# Patient Record
Sex: MALE | Race: WHITE | Employment: FULL TIME | URBAN - NONMETROPOLITAN AREA
[De-identification: names, ages, dates, MRNs, and addresses within clinical notes are randomized per-mention and may not be internally consistent; named-entity substitution may affect disease eponyms.]

---

## 2019-05-13 ENCOUNTER — OFFICE VISIT (OUTPATIENT)
Dept: FAMILY MEDICINE CLINIC | Age: 49
End: 2019-05-13
Payer: COMMERCIAL

## 2019-05-13 VITALS
RESPIRATION RATE: 17 BRPM | HEART RATE: 81 BPM | DIASTOLIC BLOOD PRESSURE: 72 MMHG | OXYGEN SATURATION: 94 % | SYSTOLIC BLOOD PRESSURE: 120 MMHG | WEIGHT: 220 LBS | TEMPERATURE: 98.7 F | BODY MASS INDEX: 33.34 KG/M2 | HEIGHT: 68 IN

## 2019-05-13 DIAGNOSIS — R05.9 COUGH: ICD-10-CM

## 2019-05-13 DIAGNOSIS — J01.00 ACUTE NON-RECURRENT MAXILLARY SINUSITIS: Primary | ICD-10-CM

## 2019-05-13 PROCEDURE — 96372 THER/PROPH/DIAG INJ SC/IM: CPT | Performed by: FAMILY MEDICINE

## 2019-05-13 PROCEDURE — 99213 OFFICE O/P EST LOW 20 MIN: CPT | Performed by: FAMILY MEDICINE

## 2019-05-13 RX ORDER — MONTELUKAST SODIUM 10 MG/1
10 TABLET ORAL NIGHTLY
COMMUNITY

## 2019-05-13 RX ORDER — AMOXICILLIN 875 MG/1
875 TABLET, COATED ORAL 2 TIMES DAILY
Qty: 20 TABLET | Refills: 0 | Status: SHIPPED | OUTPATIENT
Start: 2019-05-13 | End: 2019-05-22

## 2019-05-13 RX ORDER — METHYLPREDNISOLONE ACETATE 40 MG/ML
40 INJECTION, SUSPENSION INTRA-ARTICULAR; INTRALESIONAL; INTRAMUSCULAR; SOFT TISSUE ONCE
Status: COMPLETED | OUTPATIENT
Start: 2019-05-13 | End: 2019-05-13

## 2019-05-13 RX ORDER — PREDNISONE 10 MG/1
TABLET ORAL
Qty: 18 TABLET | Refills: 0 | Status: SHIPPED | OUTPATIENT
Start: 2019-05-13 | End: 2019-05-22

## 2019-05-13 RX ADMIN — METHYLPREDNISOLONE ACETATE 40 MG: 40 INJECTION, SUSPENSION INTRA-ARTICULAR; INTRALESIONAL; INTRAMUSCULAR; SOFT TISSUE at 09:50

## 2019-05-13 ASSESSMENT — ENCOUNTER SYMPTOMS
CHEST TIGHTNESS: 0
GASTROINTESTINAL NEGATIVE: 1
TROUBLE SWALLOWING: 0
DIARRHEA: 0
ALLERGIC/IMMUNOLOGIC NEGATIVE: 1
BACK PAIN: 0
VOMITING: 0
PHOTOPHOBIA: 0
RHINORRHEA: 1
WHEEZING: 1
SINUS PRESSURE: 1
SHORTNESS OF BREATH: 0
COUGH: 0
EYE DISCHARGE: 0
EYE REDNESS: 0
BLOOD IN STOOL: 0
ABDOMINAL PAIN: 0
EYE PAIN: 0
SINUS PAIN: 1
NAUSEA: 0
SORE THROAT: 1

## 2019-05-13 NOTE — PROGRESS NOTES
19  Gali Ortiz : 1970 Sex: male  Age: 51 y.o. Chief Complaint   Patient presents with    Head Congestion       Congestion, pressure, drainage, facial tenderness, mild headache, onset 5 days ago. Denies fever, chills, diaphoresis, nausea, vomiting, decreased oral intake. Denies other GI or  complaints. OTC treatments minimally effective. Review of Systems   Constitutional: Negative. Negative for appetite change, fatigue and unexpected weight change. HENT: Positive for congestion, postnasal drip, rhinorrhea, sinus pressure, sinus pain, sneezing and sore throat. Negative for ear pain, hearing loss and trouble swallowing. Eyes: Negative for photophobia, pain, discharge and redness. Respiratory: Positive for wheezing. Negative for cough, chest tightness and shortness of breath. Cardiovascular: Negative. Negative for chest pain, palpitations and leg swelling. Gastrointestinal: Negative. Negative for abdominal pain, blood in stool, diarrhea, nausea and vomiting. Endocrine: Negative. Genitourinary: Negative for dysuria, flank pain, frequency and hematuria. Musculoskeletal: Negative for arthralgias, back pain, joint swelling and myalgias. Skin: Negative. Allergic/Immunologic: Negative. Neurological: Negative for dizziness, seizures, syncope, weakness, light-headedness, numbness and headaches. Hematological: Negative for adenopathy. Does not bruise/bleed easily. Psychiatric/Behavioral: Negative. Current Outpatient Medications:     montelukast (SINGULAIR) 10 MG tablet, Take 10 mg by mouth nightly, Disp: , Rfl:     amoxicillin (AMOXIL) 875 MG tablet, Take 1 tablet by mouth 2 times daily for 10 days, Disp: 20 tablet, Rfl: 0    predniSONE (DELTASONE) 10 MG tablet, Take 3 tablets by mouth daily for 3 days, THEN 2 tablets daily for 3 days, THEN 1 tablet daily for 3 days. , Disp: 18 tablet, Rfl: 0    atorvastatin (LIPITOR) 40 MG tablet, Take 40 mg by mouth daily., Disp: , Rfl:     fluticasone (FLONASE) 50 MCG/ACT nasal spray, 1 spray by Nasal route daily. , Disp: , Rfl:   Allergies   Allergen Reactions    Codeine     Sulfa Antibiotics        Past Medical History:   Diagnosis Date    Diverticulitis     Hyperlipidemia     Kidney stone      Past Surgical History:   Procedure Laterality Date    BACK SURGERY      HERNIA REPAIR       No family history on file.   Social History     Socioeconomic History    Marital status:      Spouse name: Not on file    Number of children: Not on file    Years of education: Not on file    Highest education level: Not on file   Occupational History    Not on file   Social Needs    Financial resource strain: Not on file    Food insecurity:     Worry: Not on file     Inability: Not on file    Transportation needs:     Medical: Not on file     Non-medical: Not on file   Tobacco Use    Smoking status: Former Smoker     Packs/day: 2.00     Years: 18.00     Pack years: 36.00     Types: Cigarettes     Last attempt to quit: 2018     Years since quittin.4    Smokeless tobacco: Never Used   Substance and Sexual Activity    Alcohol use: Yes     Comment: Social    Drug use: No    Sexual activity: Not on file   Lifestyle    Physical activity:     Days per week: Not on file     Minutes per session: Not on file    Stress: Not on file   Relationships    Social connections:     Talks on phone: Not on file     Gets together: Not on file     Attends Muslim service: Not on file     Active member of club or organization: Not on file     Attends meetings of clubs or organizations: Not on file     Relationship status: Not on file    Intimate partner violence:     Fear of current or ex partner: Not on file     Emotionally abused: Not on file     Physically abused: Not on file     Forced sexual activity: Not on file   Other Topics Concern    Not on file   Social History Narrative    Not on file       Vitals:    19 0915   BP: 120/72   Pulse: 81   Resp: 17   Temp: 98.7 °F (37.1 °C)   TempSrc: Temporal   SpO2: 94%   Weight: 220 lb (99.8 kg)   Height: 5' 8\" (1.727 m)       Physical Exam   Constitutional: He appears well-developed. HENT:   Head: Normocephalic. Right Ear: A middle ear effusion is present. Left Ear: A middle ear effusion is present. Nose: Rhinorrhea and sinus tenderness present. Right sinus exhibits maxillary sinus tenderness. Left sinus exhibits maxillary sinus tenderness. Mouth/Throat: Oropharyngeal exudate present. Cardiovascular: Normal rate, regular rhythm and normal heart sounds. Pulmonary/Chest: Effort normal and breath sounds normal.   Abdominal: Normal appearance and bowel sounds are normal.       Assessment and Plan:  Brodie Lundborg was seen today for head congestion. Diagnoses and all orders for this visit:    Acute non-recurrent maxillary sinusitis  -     amoxicillin (AMOXIL) 875 MG tablet; Take 1 tablet by mouth 2 times daily for 10 days  -     predniSONE (DELTASONE) 10 MG tablet; Take 3 tablets by mouth daily for 3 days, THEN 2 tablets daily for 3 days, THEN 1 tablet daily for 3 days. -     methylPREDNISolone acetate (DEPO-MEDROL) injection 40 mg    Cough  -     predniSONE (DELTASONE) 10 MG tablet; Take 3 tablets by mouth daily for 3 days, THEN 2 tablets daily for 3 days, THEN 1 tablet daily for 3 days. -     methylPREDNISolone acetate (DEPO-MEDROL) injection 40 mg    Tylenol, fluids ,rest, Mucinex    Return if symptoms worsen or fail to improve.       Seen By:  Raphael Wilburn DO

## 2019-05-13 NOTE — LETTER
[facility]  [address] [id number]  Phone: [phone]  Fax: [phone] [facility],         May 13, 2019     Patient: Douglas Levin   YOB: 1970   Date of Visit: 5/13/2019       To Whom It May Concern: It is my medical opinion that Douglas Levin  Seen in my office today. May return to work on 5/14/19. If you have any questions or concerns, please don't hesitate to call.     Sincerely,        Aldo Mackay, DO

## 2019-05-22 ENCOUNTER — OFFICE VISIT (OUTPATIENT)
Dept: FAMILY MEDICINE CLINIC | Age: 49
End: 2019-05-22
Payer: COMMERCIAL

## 2019-05-22 VITALS
TEMPERATURE: 97.7 F | SYSTOLIC BLOOD PRESSURE: 122 MMHG | OXYGEN SATURATION: 98 % | WEIGHT: 215 LBS | DIASTOLIC BLOOD PRESSURE: 74 MMHG | HEART RATE: 90 BPM | BODY MASS INDEX: 32.69 KG/M2

## 2019-05-22 DIAGNOSIS — J01.40 ACUTE NON-RECURRENT PANSINUSITIS: Primary | ICD-10-CM

## 2019-05-22 DIAGNOSIS — H81.313 VERTIGO, AURAL, BILATERAL: ICD-10-CM

## 2019-05-22 PROCEDURE — 99213 OFFICE O/P EST LOW 20 MIN: CPT | Performed by: FAMILY MEDICINE

## 2019-05-22 RX ORDER — CEFDINIR 300 MG/1
300 CAPSULE ORAL 2 TIMES DAILY
Qty: 14 CAPSULE | Refills: 0 | Status: SHIPPED | OUTPATIENT
Start: 2019-05-22 | End: 2019-05-29

## 2019-05-22 RX ORDER — ROSUVASTATIN CALCIUM 10 MG/1
TABLET, COATED ORAL
COMMUNITY
Start: 2019-03-08 | End: 2019-05-22

## 2019-05-22 ASSESSMENT — ENCOUNTER SYMPTOMS
EYE PAIN: 0
SINUS PAIN: 1
SHORTNESS OF BREATH: 0
EYE DISCHARGE: 0
NAUSEA: 0
BACK PAIN: 0
DIARRHEA: 0
SINUS PRESSURE: 1
CHEST TIGHTNESS: 0
BLOOD IN STOOL: 0
SORE THROAT: 1
ABDOMINAL PAIN: 0
ALLERGIC/IMMUNOLOGIC NEGATIVE: 1
COUGH: 0
TROUBLE SWALLOWING: 0
PHOTOPHOBIA: 0
EYE REDNESS: 0
VOMITING: 0

## 2019-05-22 NOTE — PROGRESS NOTES
19  Danny Ramos : 1970 Sex: male  Age: 51 y.o. Chief Complaint   Patient presents with    Congestion    Drainage    Pharyngitis    Dizziness       Congestion, pressure, drainage, facial tenderness, mild headache, onset 10 days ago. Denies fever, chills, diaphoresis, nausea, vomiting, decreased oral intake. Denies other GI or  complaints. Seen EXP 1 week ago. Improved on Amox, Prednisome but not back to baseline yet. Mild vertigo now with cough      Review of Systems   Constitutional: Negative for appetite change, fatigue and unexpected weight change. HENT: Positive for congestion, postnasal drip, sinus pressure, sinus pain and sore throat. Negative for ear pain, hearing loss and trouble swallowing. Eyes: Negative for photophobia, pain, discharge and redness. Respiratory: Negative for cough, chest tightness and shortness of breath. Cardiovascular: Negative for chest pain, palpitations and leg swelling. Gastrointestinal: Negative for abdominal pain, blood in stool, diarrhea, nausea and vomiting. Endocrine: Negative. Genitourinary: Negative for dysuria, flank pain, frequency and hematuria. Musculoskeletal: Negative for arthralgias, back pain, joint swelling and myalgias. Skin: Negative. Allergic/Immunologic: Negative. Neurological: Positive for dizziness. Negative for seizures, syncope, weakness, light-headedness, numbness and headaches. Hematological: Negative for adenopathy. Does not bruise/bleed easily. Psychiatric/Behavioral: Negative. Current Outpatient Medications:     Meloxicam 15 MG TBDP, Take by mouth, Disp: , Rfl:     cefdinir (OMNICEF) 300 MG capsule, Take 1 capsule by mouth 2 times daily for 7 days, Disp: 14 capsule, Rfl: 0    montelukast (SINGULAIR) 10 MG tablet, Take 10 mg by mouth nightly, Disp: , Rfl:     atorvastatin (LIPITOR) 40 MG tablet, Take 40 mg by mouth daily. , Disp: , Rfl:     fluticasone (FLONASE) 50 MCG/ACT nasal spray, 1 spray by Nasal route daily. , Disp: , Rfl:   Allergies   Allergen Reactions    Codeine     Sulfa Antibiotics        Past Medical History:   Diagnosis Date    Diverticulitis     Hyperlipidemia     Kidney stone      Past Surgical History:   Procedure Laterality Date    BACK SURGERY      HERNIA REPAIR       No family history on file.   Social History     Socioeconomic History    Marital status:      Spouse name: Not on file    Number of children: Not on file    Years of education: Not on file    Highest education level: Not on file   Occupational History    Not on file   Social Needs    Financial resource strain: Not on file    Food insecurity:     Worry: Not on file     Inability: Not on file    Transportation needs:     Medical: Not on file     Non-medical: Not on file   Tobacco Use    Smoking status: Former Smoker     Packs/day: 2.00     Years: 18.00     Pack years: 36.00     Types: Cigarettes     Last attempt to quit: 2018     Years since quittin.4    Smokeless tobacco: Never Used   Substance and Sexual Activity    Alcohol use: Yes     Comment: Social    Drug use: No    Sexual activity: Not on file   Lifestyle    Physical activity:     Days per week: Not on file     Minutes per session: Not on file    Stress: Not on file   Relationships    Social connections:     Talks on phone: Not on file     Gets together: Not on file     Attends Orthodoxy service: Not on file     Active member of club or organization: Not on file     Attends meetings of clubs or organizations: Not on file     Relationship status: Not on file    Intimate partner violence:     Fear of current or ex partner: Not on file     Emotionally abused: Not on file     Physically abused: Not on file     Forced sexual activity: Not on file   Other Topics Concern    Not on file   Social History Narrative    Not on file       Vitals:    19 1323   BP: 122/74   Pulse: 90   Temp: 97.7 °F (36.5 °C)   SpO2: 98% Weight: 215 lb (97.5 kg)       Physical Exam   Constitutional: He is oriented to person, place, and time. He appears well-developed and well-nourished. HENT:   Head: Atraumatic. Right Ear: External ear normal. A middle ear effusion is present. Left Ear: External ear normal. A middle ear effusion is present. Nose: Mucosal edema and sinus tenderness present. Mouth/Throat: Posterior oropharyngeal erythema present. No oropharyngeal exudate. Eyes: Pupils are equal, round, and reactive to light. Conjunctivae and EOM are normal.   Neck: Normal range of motion. Neck supple. No tracheal deviation present. No thyromegaly present. Cardiovascular: Normal rate, regular rhythm and intact distal pulses. Exam reveals no gallop and no friction rub. No murmur heard. Pulmonary/Chest: Effort normal and breath sounds normal. No respiratory distress. Abdominal: Soft. Bowel sounds are normal.   Musculoskeletal: Normal range of motion. He exhibits no edema, tenderness or deformity. Lymphadenopathy:     He has no cervical adenopathy. Neurological: He is alert and oriented to person, place, and time. He displays normal reflexes. No sensory deficit. He exhibits normal muscle tone. Coordination normal.   Skin: Skin is warm and dry. Capillary refill takes less than 2 seconds. No rash noted. Psychiatric: He has a normal mood and affect. Assessment and Plan:  Ricardo Thomas was seen today for congestion, drainage, pharyngitis and dizziness. Diagnoses and all orders for this visit:    Acute non-recurrent pansinusitis  -     cefdinir (OMNICEF) 300 MG capsule; Take 1 capsule by mouth 2 times daily for 7 days    Vertigo, aural, bilateral    Claritin, Nasacort, fluids, Mucinex    Return in about 2 days (around 5/24/2019) for with pcp.       Seen By:  Anisha Lorenz DO

## 2019-09-13 ENCOUNTER — OFFICE VISIT (OUTPATIENT)
Dept: FAMILY MEDICINE CLINIC | Age: 49
End: 2019-09-13
Payer: COMMERCIAL

## 2019-09-13 VITALS
BODY MASS INDEX: 31.7 KG/M2 | WEIGHT: 209.13 LBS | HEART RATE: 72 BPM | DIASTOLIC BLOOD PRESSURE: 62 MMHG | HEIGHT: 68 IN | OXYGEN SATURATION: 97 % | TEMPERATURE: 98.8 F | SYSTOLIC BLOOD PRESSURE: 116 MMHG

## 2019-09-13 DIAGNOSIS — J01.91 ACUTE RECURRENT SINUSITIS, UNSPECIFIED LOCATION: Primary | ICD-10-CM

## 2019-09-13 PROCEDURE — 99213 OFFICE O/P EST LOW 20 MIN: CPT | Performed by: NURSE PRACTITIONER

## 2019-09-13 RX ORDER — PREDNISONE 10 MG/1
TABLET ORAL
Qty: 13 TABLET | Refills: 0 | Status: SHIPPED | OUTPATIENT
Start: 2019-09-13 | End: 2019-11-27

## 2019-09-13 RX ORDER — LANCETS 33 GAUGE
EACH MISCELLANEOUS
COMMUNITY
Start: 2019-07-01

## 2019-09-13 RX ORDER — CEFDINIR 300 MG/1
300 CAPSULE ORAL 2 TIMES DAILY
Qty: 14 CAPSULE | Refills: 0 | Status: SHIPPED | OUTPATIENT
Start: 2019-09-13 | End: 2019-09-20

## 2019-09-13 RX ORDER — ASPIRIN 81 MG/1
81 TABLET ORAL DAILY
COMMUNITY
Start: 2019-09-05

## 2019-09-13 RX ORDER — TICAGRELOR 90 MG/1
90 TABLET ORAL 2 TIMES DAILY
COMMUNITY
Start: 2019-09-05

## 2019-09-13 RX ORDER — CARVEDILOL 3.12 MG/1
3.12 TABLET ORAL 2 TIMES DAILY WITH MEALS
COMMUNITY
Start: 2019-08-31

## 2019-09-13 RX ORDER — LISINOPRIL 5 MG/1
5 TABLET ORAL DAILY
COMMUNITY
Start: 2019-08-31

## 2019-09-13 RX ORDER — ATORVASTATIN CALCIUM 80 MG/1
TABLET, FILM COATED ORAL
COMMUNITY
Start: 2019-08-31

## 2019-09-13 RX ORDER — PANTOPRAZOLE SODIUM 40 MG/1
40 TABLET, DELAYED RELEASE ORAL DAILY
COMMUNITY
Start: 2019-08-31

## 2019-09-13 NOTE — PROGRESS NOTES
Number of children: Not on file    Years of education: Not on file    Highest education level: Not on file   Occupational History    Not on file   Social Needs    Financial resource strain: Not on file    Food insecurity:     Worry: Not on file     Inability: Not on file    Transportation needs:     Medical: Not on file     Non-medical: Not on file   Tobacco Use    Smoking status: Former Smoker     Packs/day: 2.00     Years: 18.00     Pack years: 36.00     Types: Cigarettes     Last attempt to quit: 2018     Years since quittin.7    Smokeless tobacco: Never Used   Substance and Sexual Activity    Alcohol use: Yes     Comment: Social    Drug use: No    Sexual activity: Not on file   Lifestyle    Physical activity:     Days per week: Not on file     Minutes per session: Not on file    Stress: Not on file   Relationships    Social connections:     Talks on phone: Not on file     Gets together: Not on file     Attends Jewish service: Not on file     Active member of club or organization: Not on file     Attends meetings of clubs or organizations: Not on file     Relationship status: Not on file    Intimate partner violence:     Fear of current or ex partner: Not on file     Emotionally abused: Not on file     Physically abused: Not on file     Forced sexual activity: Not on file   Other Topics Concern    Not on file   Social History Narrative    Not on file       Objective:  Vitals:    19 0911   BP: 116/62   Pulse: 72   Temp: 98.8 °F (37.1 °C)   SpO2: 97%   Weight: 209 lb 2 oz (94.9 kg)   Height: 5' 8\" (1.727 m)       Exam:  Physical Exam   Constitutional: He is oriented to person, place, and time. He appears well-developed and well-nourished. Mildly ill   HENT:   Head: Normocephalic. Right Ear: Hearing and external ear normal.   Left Ear: Hearing and external ear normal.   Nose: Mucosal edema and rhinorrhea present.    Mouth/Throat: Mucous membranes are normal. Oropharyngeal

## 2019-11-27 ENCOUNTER — OFFICE VISIT (OUTPATIENT)
Dept: PODIATRY | Age: 49
End: 2019-11-27
Payer: COMMERCIAL

## 2019-11-27 VITALS
BODY MASS INDEX: 31.93 KG/M2 | TEMPERATURE: 97.6 F | WEIGHT: 210 LBS | DIASTOLIC BLOOD PRESSURE: 80 MMHG | SYSTOLIC BLOOD PRESSURE: 110 MMHG

## 2019-11-27 VITALS
WEIGHT: 210 LBS | BODY MASS INDEX: 31.93 KG/M2 | TEMPERATURE: 97.6 F | SYSTOLIC BLOOD PRESSURE: 110 MMHG | DIASTOLIC BLOOD PRESSURE: 80 MMHG

## 2019-11-27 DIAGNOSIS — M76.62 ACHILLES BURSITIS OF LEFT LOWER EXTREMITY: Primary | ICD-10-CM

## 2019-11-27 DIAGNOSIS — M92.62 HAGLUND'S DEFORMITY, LEFT: ICD-10-CM

## 2019-11-27 DIAGNOSIS — M76.62 ACHILLES BURSITIS OF LEFT LOWER EXTREMITY: ICD-10-CM

## 2019-11-27 DIAGNOSIS — G89.29 CHRONIC PAIN OF LEFT ANKLE: ICD-10-CM

## 2019-11-27 DIAGNOSIS — M25.572 CHRONIC PAIN OF LEFT ANKLE: Primary | ICD-10-CM

## 2019-11-27 DIAGNOSIS — M25.572 CHRONIC PAIN OF LEFT ANKLE: ICD-10-CM

## 2019-11-27 DIAGNOSIS — M77.32 CALCANEAL SPUR OF LEFT FOOT: ICD-10-CM

## 2019-11-27 DIAGNOSIS — G89.29 CHRONIC PAIN OF LEFT ANKLE: Primary | ICD-10-CM

## 2019-11-27 PROCEDURE — 99213 OFFICE O/P EST LOW 20 MIN: CPT | Performed by: PODIATRIST

## 2019-12-05 ENCOUNTER — OFFICE VISIT (OUTPATIENT)
Dept: FAMILY MEDICINE CLINIC | Age: 49
End: 2019-12-05
Payer: COMMERCIAL

## 2019-12-05 VITALS
DIASTOLIC BLOOD PRESSURE: 60 MMHG | HEIGHT: 68 IN | WEIGHT: 210.5 LBS | HEART RATE: 58 BPM | BODY MASS INDEX: 31.9 KG/M2 | SYSTOLIC BLOOD PRESSURE: 100 MMHG | OXYGEN SATURATION: 96 % | TEMPERATURE: 98.6 F

## 2019-12-05 DIAGNOSIS — H10.31 ACUTE BACTERIAL CONJUNCTIVITIS OF RIGHT EYE: ICD-10-CM

## 2019-12-05 DIAGNOSIS — J06.9 ACUTE URI: Primary | ICD-10-CM

## 2019-12-05 PROCEDURE — 99213 OFFICE O/P EST LOW 20 MIN: CPT | Performed by: PHYSICIAN ASSISTANT

## 2019-12-05 RX ORDER — METHYLPREDNISOLONE 4 MG/1
TABLET ORAL
Qty: 1 KIT | Refills: 0 | Status: SHIPPED
Start: 2019-12-05 | End: 2020-03-20

## 2019-12-05 RX ORDER — MOXIFLOXACIN 5 MG/ML
1 SOLUTION/ DROPS OPHTHALMIC 3 TIMES DAILY
Qty: 1 BOTTLE | Refills: 0 | Status: SHIPPED
Start: 2019-12-05 | End: 2020-03-20

## 2019-12-05 RX ORDER — AZITHROMYCIN 250 MG/1
250 TABLET, FILM COATED ORAL SEE ADMIN INSTRUCTIONS
Qty: 6 TABLET | Refills: 0 | Status: SHIPPED | OUTPATIENT
Start: 2019-12-05 | End: 2019-12-10

## 2019-12-05 ASSESSMENT — ENCOUNTER SYMPTOMS
WHEEZING: 0
SINUS PAIN: 1
SORE THROAT: 0
SINUS PRESSURE: 1
EYE DISCHARGE: 1
SHORTNESS OF BREATH: 0
COUGH: 0
EYE PAIN: 0
TROUBLE SWALLOWING: 0
EYE ITCHING: 0

## 2019-12-17 ENCOUNTER — HOSPITAL ENCOUNTER (OUTPATIENT)
Age: 49
Discharge: HOME | End: 2019-12-19
Payer: COMMERCIAL

## 2019-12-17 LAB — HOMOCYSTEINE: 9.1 UMOL/L (ref 0–15)

## 2019-12-17 PROCEDURE — 85300 ANTITHROMBIN III ACTIVITY: CPT

## 2019-12-17 PROCEDURE — 81241 F5 GENE: CPT

## 2019-12-17 PROCEDURE — 36415 COLL VENOUS BLD VENIPUNCTURE: CPT

## 2019-12-17 PROCEDURE — 85305 CLOT INHIBIT PROT S TOTAL: CPT

## 2019-12-17 PROCEDURE — 83090 ASSAY OF HOMOCYSTEINE: CPT

## 2019-12-17 PROCEDURE — 85303 CLOT INHIBIT PROT C ACTIVITY: CPT

## 2019-12-19 LAB — AT-III ACTIVITY: 79 % ACTIVITY (ref 83–121)

## 2019-12-20 LAB
FACTOR V LEIDEN: NEGATIVE
PROTEIN C ACTIVITY: 110 % ACTIVITY (ref 68–165)
SPECIMEN: NORMAL

## 2019-12-22 LAB — PROTEIN S, FUNCTIONAL: 97 % (ref 66–143)

## 2019-12-26 ENCOUNTER — HOSPITAL ENCOUNTER (OUTPATIENT)
Age: 49
Setting detail: OBSERVATION
Discharge: HOME | End: 2019-12-27
Attending: EMERGENCY MEDICINE | Admitting: INTERNAL MEDICINE
Payer: COMMERCIAL

## 2019-12-26 ENCOUNTER — APPOINTMENT (OUTPATIENT)
Dept: GENERAL RADIOLOGY | Age: 49
End: 2019-12-26
Payer: COMMERCIAL

## 2019-12-26 DIAGNOSIS — R07.9 CHEST PAIN, UNSPECIFIED TYPE: Primary | ICD-10-CM

## 2019-12-26 PROBLEM — E78.5 HLD (HYPERLIPIDEMIA): Status: ACTIVE | Noted: 2019-12-26

## 2019-12-26 PROBLEM — I10 ESSENTIAL HYPERTENSION: Status: ACTIVE | Noted: 2019-12-26

## 2019-12-26 LAB
ANION GAP SERPL CALCULATED.3IONS-SCNC: 12 MMOL/L (ref 7–16)
BASOPHILS ABSOLUTE: 0.07 E9/L (ref 0–0.2)
BASOPHILS RELATIVE PERCENT: 0.7 % (ref 0–2)
BUN BLDV-MCNC: 17 MG/DL (ref 6–20)
CALCIUM SERPL-MCNC: 8.7 MG/DL (ref 8.6–10.2)
CHLORIDE BLD-SCNC: 103 MMOL/L (ref 98–107)
CO2: 22 MMOL/L (ref 22–29)
CREAT SERPL-MCNC: 0.9 MG/DL (ref 0.7–1.2)
EOSINOPHILS ABSOLUTE: 0.25 E9/L (ref 0.05–0.5)
EOSINOPHILS RELATIVE PERCENT: 2.5 % (ref 0–6)
GFR AFRICAN AMERICAN: >60
GFR NON-AFRICAN AMERICAN: >60 ML/MIN/1.73
GLUCOSE BLD-MCNC: 159 MG/DL (ref 74–99)
HCT VFR BLD CALC: 41.5 % (ref 37–54)
HEMOGLOBIN: 13.8 G/DL (ref 12.5–16.5)
IMMATURE GRANULOCYTES #: 0.07 E9/L
IMMATURE GRANULOCYTES %: 0.7 % (ref 0–5)
LYMPHOCYTES ABSOLUTE: 3.22 E9/L (ref 1.5–4)
LYMPHOCYTES RELATIVE PERCENT: 31.7 % (ref 20–42)
MCH RBC QN AUTO: 31.6 PG (ref 26–35)
MCHC RBC AUTO-ENTMCNC: 33.3 % (ref 32–34.5)
MCV RBC AUTO: 95 FL (ref 80–99.9)
MONOCYTES ABSOLUTE: 0.82 E9/L (ref 0.1–0.95)
MONOCYTES RELATIVE PERCENT: 8.1 % (ref 2–12)
NEUTROPHILS ABSOLUTE: 5.74 E9/L (ref 1.8–7.3)
NEUTROPHILS RELATIVE PERCENT: 56.3 % (ref 43–80)
PDW BLD-RTO: 13 FL (ref 11.5–15)
PLATELET # BLD: 177 E9/L (ref 130–450)
PMV BLD AUTO: 9.7 FL (ref 7–12)
POTASSIUM SERPL-SCNC: 3.6 MMOL/L (ref 3.5–5)
PRO-BNP: 63 PG/ML (ref 0–125)
RBC # BLD: 4.37 E12/L (ref 3.8–5.8)
SODIUM BLD-SCNC: 137 MMOL/L (ref 132–146)
TROPONIN: <0.01 NG/ML (ref 0–0.03)
WBC # BLD: 10.2 E9/L (ref 4.5–11.5)

## 2019-12-26 PROCEDURE — 6370000000 HC RX 637 (ALT 250 FOR IP): Performed by: STUDENT IN AN ORGANIZED HEALTH CARE EDUCATION/TRAINING PROGRAM

## 2019-12-26 PROCEDURE — 99285 EMERGENCY DEPT VISIT HI MDM: CPT

## 2019-12-26 PROCEDURE — 94760 N-INVAS EAR/PLS OXIMETRY 1: CPT

## 2019-12-26 PROCEDURE — 93005 ELECTROCARDIOGRAM TRACING: CPT | Performed by: STUDENT IN AN ORGANIZED HEALTH CARE EDUCATION/TRAINING PROGRAM

## 2019-12-26 PROCEDURE — 84484 ASSAY OF TROPONIN QUANT: CPT

## 2019-12-26 PROCEDURE — 85025 COMPLETE CBC W/AUTO DIFF WBC: CPT

## 2019-12-26 PROCEDURE — 71045 X-RAY EXAM CHEST 1 VIEW: CPT

## 2019-12-26 PROCEDURE — 83880 ASSAY OF NATRIURETIC PEPTIDE: CPT

## 2019-12-26 PROCEDURE — 80048 BASIC METABOLIC PNL TOTAL CA: CPT

## 2019-12-26 RX ORDER — ASPIRIN 81 MG/1
243 TABLET, CHEWABLE ORAL ONCE
Status: COMPLETED | OUTPATIENT
Start: 2019-12-26 | End: 2019-12-26

## 2019-12-26 RX ADMIN — ASPIRIN 81 MG 243 MG: 81 TABLET ORAL at 23:04

## 2019-12-26 ASSESSMENT — PAIN SCALES - GENERAL
PAINLEVEL_OUTOF10: 1
PAINLEVEL_OUTOF10: 8

## 2019-12-26 ASSESSMENT — PAIN DESCRIPTION - PROGRESSION: CLINICAL_PROGRESSION: NOT CHANGED

## 2019-12-26 ASSESSMENT — PAIN DESCRIPTION - PAIN TYPE
TYPE: ACUTE PAIN
TYPE: ACUTE PAIN

## 2019-12-26 ASSESSMENT — HEART SCORE: ECG: 1

## 2019-12-26 ASSESSMENT — PAIN DESCRIPTION - FREQUENCY
FREQUENCY: INTERMITTENT
FREQUENCY: INTERMITTENT

## 2019-12-26 ASSESSMENT — PAIN DESCRIPTION - ORIENTATION: ORIENTATION: MID

## 2019-12-26 ASSESSMENT — PAIN DESCRIPTION - DESCRIPTORS: DESCRIPTORS: ACHING

## 2019-12-26 ASSESSMENT — PAIN DESCRIPTION - LOCATION
LOCATION: CHEST
LOCATION: CHEST

## 2019-12-27 ENCOUNTER — APPOINTMENT (OUTPATIENT)
Dept: NUCLEAR MEDICINE | Age: 49
End: 2019-12-27
Payer: COMMERCIAL

## 2019-12-27 VITALS
WEIGHT: 205.4 LBS | HEIGHT: 68 IN | TEMPERATURE: 97.9 F | OXYGEN SATURATION: 97 % | DIASTOLIC BLOOD PRESSURE: 68 MMHG | HEART RATE: 74 BPM | SYSTOLIC BLOOD PRESSURE: 117 MMHG | RESPIRATION RATE: 16 BRPM | BODY MASS INDEX: 31.13 KG/M2

## 2019-12-27 PROBLEM — E78.5 HLD (HYPERLIPIDEMIA): Status: RESOLVED | Noted: 2019-12-26 | Resolved: 2019-12-27

## 2019-12-27 PROBLEM — E78.5 HYPERLIPIDEMIA LDL GOAL <100: Chronic | Status: ACTIVE | Noted: 2019-12-27

## 2019-12-27 PROBLEM — R07.9 CHEST PAIN: Status: ACTIVE | Noted: 2019-12-27

## 2019-12-27 PROBLEM — R07.9 CHEST PAIN: Status: RESOLVED | Noted: 2019-12-26 | Resolved: 2019-12-27

## 2019-12-27 PROBLEM — I25.10 CAD (CORONARY ARTERY DISEASE), NATIVE CORONARY ARTERY: Chronic | Status: ACTIVE | Noted: 2019-12-27

## 2019-12-27 PROBLEM — E66.9 OBESITY (BMI 30-39.9): Chronic | Status: ACTIVE | Noted: 2019-12-27

## 2019-12-27 LAB
ANION GAP SERPL CALCULATED.3IONS-SCNC: 13 MMOL/L (ref 7–16)
BUN BLDV-MCNC: 16 MG/DL (ref 6–20)
CALCIUM SERPL-MCNC: 8.8 MG/DL (ref 8.6–10.2)
CHLORIDE BLD-SCNC: 104 MMOL/L (ref 98–107)
CO2: 22 MMOL/L (ref 22–29)
CREAT SERPL-MCNC: 1 MG/DL (ref 0.7–1.2)
EKG ATRIAL RATE: 65 BPM
EKG P AXIS: 12 DEGREES
EKG P-R INTERVAL: 126 MS
EKG Q-T INTERVAL: 400 MS
EKG QRS DURATION: 92 MS
EKG QTC CALCULATION (BAZETT): 416 MS
EKG R AXIS: -12 DEGREES
EKG T AXIS: 51 DEGREES
EKG VENTRICULAR RATE: 65 BPM
GFR AFRICAN AMERICAN: >60
GFR NON-AFRICAN AMERICAN: >60 ML/MIN/1.73
GLUCOSE BLD-MCNC: 135 MG/DL (ref 74–99)
HCT VFR BLD CALC: 42.7 % (ref 37–54)
HEMOGLOBIN: 13.8 G/DL (ref 12.5–16.5)
LV EF: 40 %
LVEF MODALITY: NORMAL
MCH RBC QN AUTO: 31.3 PG (ref 26–35)
MCHC RBC AUTO-ENTMCNC: 32.3 % (ref 32–34.5)
MCV RBC AUTO: 96.8 FL (ref 80–99.9)
PDW BLD-RTO: 13.1 FL (ref 11.5–15)
PLATELET # BLD: 187 E9/L (ref 130–450)
PMV BLD AUTO: 9.9 FL (ref 7–12)
POTASSIUM REFLEX MAGNESIUM: 3.9 MMOL/L (ref 3.5–5)
RBC # BLD: 4.41 E12/L (ref 3.8–5.8)
SODIUM BLD-SCNC: 139 MMOL/L (ref 132–146)
TROPONIN: <0.01 NG/ML (ref 0–0.03)
WBC # BLD: 8.6 E9/L (ref 4.5–11.5)

## 2019-12-27 PROCEDURE — 6360000002 HC RX W HCPCS: Performed by: NURSE PRACTITIONER

## 2019-12-27 PROCEDURE — 2580000003 HC RX 258: Performed by: NURSE PRACTITIONER

## 2019-12-27 PROCEDURE — 78452 HT MUSCLE IMAGE SPECT MULT: CPT

## 2019-12-27 PROCEDURE — APPSS60 APP SPLIT SHARED TIME 46-60 MINUTES: Performed by: PHYSICIAN ASSISTANT

## 2019-12-27 PROCEDURE — 3430000000 HC RX DIAGNOSTIC RADIOPHARMACEUTICAL: Performed by: RADIOLOGY

## 2019-12-27 PROCEDURE — G0378 HOSPITAL OBSERVATION PER HR: HCPCS

## 2019-12-27 PROCEDURE — 93017 CV STRESS TEST TRACING ONLY: CPT

## 2019-12-27 PROCEDURE — 84484 ASSAY OF TROPONIN QUANT: CPT

## 2019-12-27 PROCEDURE — 93016 CV STRESS TEST SUPVJ ONLY: CPT | Performed by: INTERNAL MEDICINE

## 2019-12-27 PROCEDURE — A9500 TC99M SESTAMIBI: HCPCS | Performed by: RADIOLOGY

## 2019-12-27 PROCEDURE — 6370000000 HC RX 637 (ALT 250 FOR IP): Performed by: NURSE PRACTITIONER

## 2019-12-27 PROCEDURE — 85027 COMPLETE CBC AUTOMATED: CPT

## 2019-12-27 PROCEDURE — 36415 COLL VENOUS BLD VENIPUNCTURE: CPT

## 2019-12-27 PROCEDURE — 80048 BASIC METABOLIC PNL TOTAL CA: CPT

## 2019-12-27 PROCEDURE — 96372 THER/PROPH/DIAG INJ SC/IM: CPT

## 2019-12-27 PROCEDURE — 99244 OFF/OP CNSLTJ NEW/EST MOD 40: CPT | Performed by: INTERNAL MEDICINE

## 2019-12-27 PROCEDURE — 93018 CV STRESS TEST I&R ONLY: CPT | Performed by: INTERNAL MEDICINE

## 2019-12-27 RX ORDER — PANTOPRAZOLE SODIUM 40 MG/1
40 TABLET, DELAYED RELEASE ORAL
Status: DISCONTINUED | OUTPATIENT
Start: 2019-12-27 | End: 2019-12-27 | Stop reason: HOSPADM

## 2019-12-27 RX ORDER — POTASSIUM CHLORIDE 7.45 MG/ML
10 INJECTION INTRAVENOUS PRN
Status: DISCONTINUED | OUTPATIENT
Start: 2019-12-27 | End: 2019-12-27 | Stop reason: HOSPADM

## 2019-12-27 RX ORDER — MAGNESIUM SULFATE 1 G/100ML
1 INJECTION INTRAVENOUS PRN
Status: DISCONTINUED | OUTPATIENT
Start: 2019-12-27 | End: 2019-12-27 | Stop reason: HOSPADM

## 2019-12-27 RX ORDER — SODIUM CHLORIDE 0.9 % (FLUSH) 0.9 %
10 SYRINGE (ML) INJECTION EVERY 12 HOURS SCHEDULED
Status: DISCONTINUED | OUTPATIENT
Start: 2019-12-27 | End: 2019-12-27 | Stop reason: HOSPADM

## 2019-12-27 RX ORDER — SODIUM CHLORIDE 0.9 % (FLUSH) 0.9 %
10 SYRINGE (ML) INJECTION PRN
Status: DISCONTINUED | OUTPATIENT
Start: 2019-12-27 | End: 2019-12-27 | Stop reason: HOSPADM

## 2019-12-27 RX ORDER — ACETAMINOPHEN 325 MG/1
650 TABLET ORAL EVERY 4 HOURS PRN
Status: DISCONTINUED | OUTPATIENT
Start: 2019-12-27 | End: 2019-12-27 | Stop reason: HOSPADM

## 2019-12-27 RX ORDER — ATORVASTATIN CALCIUM 40 MG/1
40 TABLET, FILM COATED ORAL NIGHTLY
Status: DISCONTINUED | OUTPATIENT
Start: 2019-12-27 | End: 2019-12-27 | Stop reason: HOSPADM

## 2019-12-27 RX ORDER — CARVEDILOL 3.12 MG/1
3.12 TABLET ORAL 2 TIMES DAILY WITH MEALS
Status: DISCONTINUED | OUTPATIENT
Start: 2019-12-27 | End: 2019-12-27 | Stop reason: HOSPADM

## 2019-12-27 RX ORDER — POTASSIUM CHLORIDE 20 MEQ/1
40 TABLET, EXTENDED RELEASE ORAL PRN
Status: DISCONTINUED | OUTPATIENT
Start: 2019-12-27 | End: 2019-12-27 | Stop reason: HOSPADM

## 2019-12-27 RX ORDER — LISINOPRIL 5 MG/1
5 TABLET ORAL DAILY
Status: DISCONTINUED | OUTPATIENT
Start: 2019-12-27 | End: 2019-12-27 | Stop reason: HOSPADM

## 2019-12-27 RX ORDER — ONDANSETRON 2 MG/ML
4 INJECTION INTRAMUSCULAR; INTRAVENOUS EVERY 6 HOURS PRN
Status: DISCONTINUED | OUTPATIENT
Start: 2019-12-27 | End: 2019-12-27 | Stop reason: HOSPADM

## 2019-12-27 RX ORDER — ASPIRIN 81 MG/1
81 TABLET, CHEWABLE ORAL DAILY
Status: DISCONTINUED | OUTPATIENT
Start: 2019-12-27 | End: 2019-12-27 | Stop reason: HOSPADM

## 2019-12-27 RX ADMIN — ENOXAPARIN SODIUM 40 MG: 40 INJECTION SUBCUTANEOUS at 09:41

## 2019-12-27 RX ADMIN — CARVEDILOL 3.12 MG: 3.12 TABLET, FILM COATED ORAL at 17:28

## 2019-12-27 RX ADMIN — SODIUM CHLORIDE, PRESERVATIVE FREE 10 ML: 5 INJECTION INTRAVENOUS at 09:43

## 2019-12-27 RX ADMIN — TICAGRELOR 90 MG: 90 TABLET ORAL at 09:41

## 2019-12-27 RX ADMIN — NITROGLYCERIN 0.5 INCH: 20 OINTMENT TOPICAL at 01:42

## 2019-12-27 RX ADMIN — Medication 10 MILLICURIE: at 12:15

## 2019-12-27 RX ADMIN — CARVEDILOL 3.12 MG: 3.12 TABLET, FILM COATED ORAL at 07:56

## 2019-12-27 RX ADMIN — PANTOPRAZOLE SODIUM 40 MG: 40 TABLET, DELAYED RELEASE ORAL at 06:10

## 2019-12-27 RX ADMIN — NITROGLYCERIN 0.5 INCH: 20 OINTMENT TOPICAL at 06:10

## 2019-12-27 RX ADMIN — ASPIRIN 81 MG 81 MG: 81 TABLET ORAL at 09:41

## 2019-12-27 RX ADMIN — Medication 30 MILLICURIE: at 13:30

## 2019-12-27 ASSESSMENT — PAIN SCALES - GENERAL
PAINLEVEL_OUTOF10: 0

## 2019-12-27 ASSESSMENT — ENCOUNTER SYMPTOMS
WHEEZING: 0
VOMITING: 0
NAUSEA: 0
COUGH: 0
ABDOMINAL PAIN: 0
BACK PAIN: 0
SHORTNESS OF BREATH: 0
PHOTOPHOBIA: 0
CHEST TIGHTNESS: 0

## 2020-01-06 ENCOUNTER — TELEPHONE (OUTPATIENT)
Dept: CARDIOLOGY CLINIC | Age: 50
End: 2020-01-06

## 2020-02-21 ENCOUNTER — OFFICE VISIT (OUTPATIENT)
Dept: FAMILY MEDICINE CLINIC | Age: 50
End: 2020-02-21
Payer: COMMERCIAL

## 2020-02-21 VITALS
HEART RATE: 78 BPM | TEMPERATURE: 97.5 F | WEIGHT: 209 LBS | RESPIRATION RATE: 20 BRPM | OXYGEN SATURATION: 96 % | BODY MASS INDEX: 31.67 KG/M2 | HEIGHT: 68 IN

## 2020-02-21 PROCEDURE — 99213 OFFICE O/P EST LOW 20 MIN: CPT | Performed by: NURSE PRACTITIONER

## 2020-02-21 RX ORDER — PREDNISONE 10 MG/1
TABLET ORAL
Qty: 18 TABLET | Refills: 0 | Status: SHIPPED | OUTPATIENT
Start: 2020-02-21 | End: 2020-03-01

## 2020-02-21 RX ORDER — AMOXICILLIN AND CLAVULANATE POTASSIUM 875; 125 MG/1; MG/1
TABLET, FILM COATED ORAL
COMMUNITY
Start: 2020-01-23 | End: 2020-03-20

## 2020-02-21 RX ORDER — CEFDINIR 300 MG/1
300 CAPSULE ORAL 2 TIMES DAILY
Qty: 20 CAPSULE | Refills: 0 | Status: SHIPPED | OUTPATIENT
Start: 2020-02-21 | End: 2020-03-02

## 2020-02-21 ASSESSMENT — PATIENT HEALTH QUESTIONNAIRE - PHQ9
1. LITTLE INTEREST OR PLEASURE IN DOING THINGS: 0
SUM OF ALL RESPONSES TO PHQ QUESTIONS 1-9: 0
SUM OF ALL RESPONSES TO PHQ QUESTIONS 1-9: 0

## 2020-03-20 ENCOUNTER — HOSPITAL ENCOUNTER (OUTPATIENT)
Age: 50
Setting detail: OBSERVATION
Discharge: HOME | End: 2020-03-21
Attending: EMERGENCY MEDICINE | Admitting: INTERNAL MEDICINE
Payer: COMMERCIAL

## 2020-03-20 ENCOUNTER — APPOINTMENT (OUTPATIENT)
Dept: GENERAL RADIOLOGY | Age: 50
End: 2020-03-20
Payer: COMMERCIAL

## 2020-03-20 LAB
ANION GAP SERPL CALCULATED.3IONS-SCNC: 12 MMOL/L (ref 7–16)
BASOPHILS ABSOLUTE: 0.07 E9/L (ref 0–0.2)
BASOPHILS RELATIVE PERCENT: 0.8 % (ref 0–2)
BUN BLDV-MCNC: 13 MG/DL (ref 6–20)
CALCIUM SERPL-MCNC: 9.1 MG/DL (ref 8.6–10.2)
CHLORIDE BLD-SCNC: 102 MMOL/L (ref 98–107)
CO2: 23 MMOL/L (ref 22–29)
CREAT SERPL-MCNC: 0.8 MG/DL (ref 0.7–1.2)
EOSINOPHILS ABSOLUTE: 0.3 E9/L (ref 0.05–0.5)
EOSINOPHILS RELATIVE PERCENT: 3.6 % (ref 0–6)
GFR AFRICAN AMERICAN: >60
GFR NON-AFRICAN AMERICAN: >60 ML/MIN/1.73
GLUCOSE BLD-MCNC: 187 MG/DL (ref 74–99)
HCT VFR BLD CALC: 40.8 % (ref 37–54)
HEMOGLOBIN: 13.8 G/DL (ref 12.5–16.5)
IMMATURE GRANULOCYTES #: 0.08 E9/L
IMMATURE GRANULOCYTES %: 1 % (ref 0–5)
LYMPHOCYTES ABSOLUTE: 2.07 E9/L (ref 1.5–4)
LYMPHOCYTES RELATIVE PERCENT: 24.6 % (ref 20–42)
MCH RBC QN AUTO: 31.8 PG (ref 26–35)
MCHC RBC AUTO-ENTMCNC: 33.8 % (ref 32–34.5)
MCV RBC AUTO: 94 FL (ref 80–99.9)
MONOCYTES ABSOLUTE: 0.64 E9/L (ref 0.1–0.95)
MONOCYTES RELATIVE PERCENT: 7.6 % (ref 2–12)
NEUTROPHILS ABSOLUTE: 5.25 E9/L (ref 1.8–7.3)
NEUTROPHILS RELATIVE PERCENT: 62.4 % (ref 43–80)
PDW BLD-RTO: 12.5 FL (ref 11.5–15)
PLATELET # BLD: 202 E9/L (ref 130–450)
PMV BLD AUTO: 9.6 FL (ref 7–12)
POTASSIUM REFLEX MAGNESIUM: 3.9 MMOL/L (ref 3.5–5)
RBC # BLD: 4.34 E12/L (ref 3.8–5.8)
REASON FOR REJECTION: NORMAL
REJECTED TEST: NORMAL
SODIUM BLD-SCNC: 137 MMOL/L (ref 132–146)
TROPONIN: 0.03 NG/ML (ref 0–0.03)
TROPONIN: 0.03 NG/ML (ref 0–0.03)
TROPONIN: <0.01 NG/ML (ref 0–0.03)
WBC # BLD: 8.4 E9/L (ref 4.5–11.5)

## 2020-03-20 PROCEDURE — 71046 X-RAY EXAM CHEST 2 VIEWS: CPT

## 2020-03-20 PROCEDURE — 80048 BASIC METABOLIC PNL TOTAL CA: CPT

## 2020-03-20 PROCEDURE — G0378 HOSPITAL OBSERVATION PER HR: HCPCS

## 2020-03-20 PROCEDURE — 93005 ELECTROCARDIOGRAM TRACING: CPT | Performed by: EMERGENCY MEDICINE

## 2020-03-20 PROCEDURE — 99285 EMERGENCY DEPT VISIT HI MDM: CPT

## 2020-03-20 PROCEDURE — 6370000000 HC RX 637 (ALT 250 FOR IP): Performed by: INTERNAL MEDICINE

## 2020-03-20 PROCEDURE — 36415 COLL VENOUS BLD VENIPUNCTURE: CPT

## 2020-03-20 PROCEDURE — 85025 COMPLETE CBC W/AUTO DIFF WBC: CPT

## 2020-03-20 PROCEDURE — 2580000003 HC RX 258: Performed by: INTERNAL MEDICINE

## 2020-03-20 PROCEDURE — 84484 ASSAY OF TROPONIN QUANT: CPT

## 2020-03-20 RX ORDER — MONTELUKAST SODIUM 10 MG/1
10 TABLET ORAL NIGHTLY
Status: DISCONTINUED | OUTPATIENT
Start: 2020-03-20 | End: 2020-03-21 | Stop reason: HOSPADM

## 2020-03-20 RX ORDER — CARVEDILOL 3.12 MG/1
3.12 TABLET ORAL 2 TIMES DAILY WITH MEALS
Status: DISCONTINUED | OUTPATIENT
Start: 2020-03-20 | End: 2020-03-21 | Stop reason: HOSPADM

## 2020-03-20 RX ORDER — CHOLECALCIFEROL (VITAMIN D3) 1250 MCG
1 CAPSULE ORAL DAILY
Status: ON HOLD | COMMUNITY
End: 2020-03-21 | Stop reason: HOSPADM

## 2020-03-20 RX ORDER — ACETAMINOPHEN 325 MG/1
650 TABLET ORAL EVERY 6 HOURS PRN
Status: DISCONTINUED | OUTPATIENT
Start: 2020-03-20 | End: 2020-03-21 | Stop reason: HOSPADM

## 2020-03-20 RX ORDER — FLUTICASONE PROPIONATE 50 MCG
1 SPRAY, SUSPENSION (ML) NASAL DAILY
Status: DISCONTINUED | OUTPATIENT
Start: 2020-03-20 | End: 2020-03-21 | Stop reason: HOSPADM

## 2020-03-20 RX ORDER — LISINOPRIL 5 MG/1
5 TABLET ORAL DAILY
Status: DISCONTINUED | OUTPATIENT
Start: 2020-03-20 | End: 2020-03-21 | Stop reason: HOSPADM

## 2020-03-20 RX ORDER — ZINC GLUCONATE 50 MG
50 TABLET ORAL NIGHTLY
Status: ON HOLD | COMMUNITY
End: 2020-03-21 | Stop reason: HOSPADM

## 2020-03-20 RX ORDER — PROMETHAZINE HYDROCHLORIDE 25 MG/1
12.5 TABLET ORAL EVERY 6 HOURS PRN
Status: DISCONTINUED | OUTPATIENT
Start: 2020-03-20 | End: 2020-03-21 | Stop reason: HOSPADM

## 2020-03-20 RX ORDER — ASPIRIN 81 MG/1
81 TABLET ORAL DAILY
Status: DISCONTINUED | OUTPATIENT
Start: 2020-03-20 | End: 2020-03-21 | Stop reason: HOSPADM

## 2020-03-20 RX ORDER — AMPICILLIN TRIHYDRATE 250 MG
1000 CAPSULE ORAL 2 TIMES DAILY
Status: ON HOLD | COMMUNITY
End: 2020-03-21 | Stop reason: HOSPADM

## 2020-03-20 RX ORDER — SODIUM CHLORIDE 0.9 % (FLUSH) 0.9 %
10 SYRINGE (ML) INJECTION PRN
Status: DISCONTINUED | OUTPATIENT
Start: 2020-03-20 | End: 2020-03-21 | Stop reason: HOSPADM

## 2020-03-20 RX ORDER — POLYETHYLENE GLYCOL 3350 17 G/17G
17 POWDER, FOR SOLUTION ORAL DAILY PRN
Status: DISCONTINUED | OUTPATIENT
Start: 2020-03-20 | End: 2020-03-21 | Stop reason: HOSPADM

## 2020-03-20 RX ORDER — SODIUM CHLORIDE 0.9 % (FLUSH) 0.9 %
10 SYRINGE (ML) INJECTION EVERY 12 HOURS SCHEDULED
Status: DISCONTINUED | OUTPATIENT
Start: 2020-03-20 | End: 2020-03-21 | Stop reason: HOSPADM

## 2020-03-20 RX ORDER — ATORVASTATIN CALCIUM 40 MG/1
80 TABLET, FILM COATED ORAL DAILY
Status: DISCONTINUED | OUTPATIENT
Start: 2020-03-20 | End: 2020-03-21 | Stop reason: HOSPADM

## 2020-03-20 RX ORDER — ONDANSETRON 2 MG/ML
4 INJECTION INTRAMUSCULAR; INTRAVENOUS EVERY 6 HOURS PRN
Status: DISCONTINUED | OUTPATIENT
Start: 2020-03-20 | End: 2020-03-21 | Stop reason: HOSPADM

## 2020-03-20 RX ORDER — ACETAMINOPHEN 650 MG/1
650 SUPPOSITORY RECTAL EVERY 6 HOURS PRN
Status: DISCONTINUED | OUTPATIENT
Start: 2020-03-20 | End: 2020-03-21 | Stop reason: HOSPADM

## 2020-03-20 RX ORDER — PANTOPRAZOLE SODIUM 40 MG/1
40 TABLET, DELAYED RELEASE ORAL
Status: DISCONTINUED | OUTPATIENT
Start: 2020-03-21 | End: 2020-03-21 | Stop reason: HOSPADM

## 2020-03-20 RX ORDER — ASCORBIC ACID 125 MG
1 TABLET,CHEWABLE ORAL DAILY
COMMUNITY

## 2020-03-20 RX ADMIN — ATORVASTATIN CALCIUM 80 MG: 40 TABLET, FILM COATED ORAL at 22:22

## 2020-03-20 RX ADMIN — CARVEDILOL 3.12 MG: 3.12 TABLET, FILM COATED ORAL at 22:21

## 2020-03-20 RX ADMIN — Medication 10 ML: at 22:22

## 2020-03-20 RX ADMIN — TICAGRELOR 90 MG: 90 TABLET ORAL at 22:21

## 2020-03-20 RX ADMIN — MONTELUKAST SODIUM 10 MG: 10 TABLET, FILM COATED ORAL at 22:21

## 2020-03-20 ASSESSMENT — ENCOUNTER SYMPTOMS
COUGH: 0
EYE REDNESS: 0
SORE THROAT: 0
ABDOMINAL PAIN: 0
EYE DISCHARGE: 0
NAUSEA: 0
VOMITING: 0
SINUS PRESSURE: 0
DIARRHEA: 0
BACK PAIN: 0
SHORTNESS OF BREATH: 0
WHEEZING: 0
EYE PAIN: 0

## 2020-03-20 ASSESSMENT — PAIN SCALES - GENERAL
PAINLEVEL_OUTOF10: 1
PAINLEVEL_OUTOF10: 0
PAINLEVEL_OUTOF10: 0

## 2020-03-20 ASSESSMENT — PAIN DESCRIPTION - DESCRIPTORS: DESCRIPTORS: TIGHTNESS

## 2020-03-20 ASSESSMENT — PAIN DESCRIPTION - FREQUENCY: FREQUENCY: CONTINUOUS

## 2020-03-20 ASSESSMENT — PAIN DESCRIPTION - PAIN TYPE: TYPE: ACUTE PAIN

## 2020-03-20 ASSESSMENT — PAIN DESCRIPTION - LOCATION: LOCATION: CHEST

## 2020-03-20 NOTE — ED PROVIDER NOTES
and risk factors, discussed with patient and will perform delta troponin. Repeat troponin was 0.03. With patient's troponin bump, patient would benefit from admission and cardiac work-up. Educated patient about symptoms, diagnosis and need to stay in the hospital for evaluation and care. He verbalized understanding is agreeable to plan. Consult placed to Dr. Emilie Ortiz for admission who agreed. All questions were answered. Patient is admitted. ED Course as of Mar 21 0642   Fri Mar 20, 2020   1442 Updated patient about lab work and imaging studies. Discussed a delta troponin. Patient will wait around to receive it. No return of chest pain since being in the department. He states he is worried about a hiatal hernia because of this pain that he has been occurring when he drinks liquids. []   9199 Increased since initial <0.01   Troponin: 0.03 []   1857 Discussed results and plan and patient is agreeable with staying. He has not not had any further chest pain since arrival.    []   [address] [address] -     []      ED Course User Index  [KP] Karina Burns DO  [] Itzel Paez DO        ----------------------------------------------- PAST HISTORY --------------------------------------------  Past Medical History:  has a past medical history of Chronic sinus complaints, Diverticulitis, Hyperlipidemia, and Kidney stone. Past Surgical History:  has a past surgical history that includes hernia repair and back surgery. Social History:  reports that he quit smoking about 14 months ago. His smoking use included cigarettes. He started smoking about 33 years ago. He has a 36.00 pack-year smoking history. He has never used smokeless tobacco. He reports current alcohol use. He reports that he does not use drugs. Family History: family history includes Heart Attack in his father. The patients home medications have been reviewed.     Allergies: Codeine; Morphine; and Sulfa antibiotics    ------------------------------------------------ RESULTS ---------------------------------------------------    LABS:  Results for orders placed or performed during the hospital encounter of 03/20/20   CBC Auto Differential   Result Value Ref Range    WBC 8.4 4.5 - 11.5 E9/L    RBC 4.34 3.80 - 5.80 E12/L    Hemoglobin 13.8 12.5 - 16.5 g/dL    Hematocrit 40.8 37.0 - 54.0 %    MCV 94.0 80.0 - 99.9 fL    MCH 31.8 26.0 - 35.0 pg    MCHC 33.8 32.0 - 34.5 %    RDW 12.5 11.5 - 15.0 fL    Platelets 991 637 - 116 E9/L    MPV 9.6 7.0 - 12.0 fL    Neutrophils % 62.4 43.0 - 80.0 %    Immature Granulocytes % 1.0 0.0 - 5.0 %    Lymphocytes % 24.6 20.0 - 42.0 %    Monocytes % 7.6 2.0 - 12.0 %    Eosinophils % 3.6 0.0 - 6.0 %    Basophils % 0.8 0.0 - 2.0 %    Neutrophils Absolute 5.25 1.80 - 7.30 E9/L    Immature Granulocytes # 0.08 E9/L    Lymphocytes Absolute 2.07 1.50 - 4.00 E9/L    Monocytes Absolute 0.64 0.10 - 0.95 E9/L    Eosinophils Absolute 0.30 0.05 - 0.50 E9/L    Basophils Absolute 0.07 0.00 - 0.20 Z4/J   Basic Metabolic Panel w/ Reflex to MG   Result Value Ref Range    Sodium 137 132 - 146 mmol/L    Potassium reflex Magnesium 3.9 3.5 - 5.0 mmol/L    Chloride 102 98 - 107 mmol/L    CO2 23 22 - 29 mmol/L    Anion Gap 12 7 - 16 mmol/L    Glucose 187 (H) 74 - 99 mg/dL    BUN 13 6 - 20 mg/dL    CREATININE 0.8 0.7 - 1.2 mg/dL    GFR Non-African American >60 >=60 mL/min/1.73    GFR African American >60     Calcium 9.1 8.6 - 10.2 mg/dL   Troponin   Result Value Ref Range    Troponin <0.01 0.00 - 0.03 ng/mL   SPECIMEN REJECTION   Result Value Ref Range    Rejected Test trop     Reason for Rejection see below    Troponin   Result Value Ref Range    Troponin 0.03 0.00 - 0.03 ng/mL   Troponin   Result Value Ref Range    Troponin 0.03 0.00 - 0.03 ng/mL   Troponin   Result Value Ref Range    Troponin 0.03 0.00 - 0.03 ng/mL   EKG 12 Lead   Result Value Ref Range    Ventricular Rate 65 BPM    Atrial Rate 65 BPM    P-R Interval 124 ms    QRS Duration 90 ms    Q-T Interval 394 ms    QTc Calculation (Bazett) 409 ms    P Axis 22 degrees    R Axis -14 degrees    T Axis 42 degrees       RADIOLOGY:    All Radiology results interpreted by Radiologist unless otherwise noted. XR CHEST STANDARD (2 VW)   Final Result   No acute cardiopulmonary findings. EKG:  This EKG is signed and interpreted by ED Physician. Time:  1404   Rate: 65  Rhythm: Sinus. Interpretation: no acute changes. No STEMI. Septal infarct (seen on prior)  Comparison: stable as compared to patient's most recent EKG.    ---------------------------- NURSING NOTES AND VITALS REVIEWED -------------------------   The nursing notes within the ED encounter and vital signs as below have been reviewed.    /67   Pulse 62   Temp 98.1 °F (36.7 °C) (Oral)   Resp 18   Ht 5' 8\" (1.727 m)   Wt 115 lb 6.4 oz (52.3 kg)   SpO2 98%   BMI 17.55 kg/m²   Oxygen Saturation Interpretation: Normal      ------------------------------------------PROGRESS NOTES -------------------------------------------    ED COURSE MEDICATIONS:                Medications   aspirin EC tablet 81 mg (81 mg Oral Not Given 3/20/20 2222)   atorvastatin (LIPITOR) tablet 80 mg (80 mg Oral Given 3/20/20 2222)   ticagrelor (BRILINTA) tablet 90 mg (90 mg Oral Given 3/20/20 2221)   carvedilol (COREG) tablet 3.125 mg (3.125 mg Oral Given 3/20/20 2221)   fluticasone (FLONASE) 50 MCG/ACT nasal spray 1 spray (1 spray Nasal Not Given 3/20/20 2222)   lisinopril (PRINIVIL;ZESTRIL) tablet 5 mg (5 mg Oral Not Given 3/20/20 2222)   montelukast (SINGULAIR) tablet 10 mg (10 mg Oral Given 3/20/20 2221)   pantoprazole (PROTONIX) tablet 40 mg (has no administration in time range)   sodium chloride flush 0.9 % injection 10 mL (10 mLs Intravenous Given 3/20/20 2222)   sodium chloride flush 0.9 % injection 10 mL (has no administration in time range)   acetaminophen (TYLENOL) tablet 650 mg (has no administration in

## 2020-03-21 VITALS
WEIGHT: 211.7 LBS | BODY MASS INDEX: 32.09 KG/M2 | SYSTOLIC BLOOD PRESSURE: 122 MMHG | HEART RATE: 61 BPM | OXYGEN SATURATION: 99 % | DIASTOLIC BLOOD PRESSURE: 71 MMHG | RESPIRATION RATE: 18 BRPM | HEIGHT: 68 IN | TEMPERATURE: 96.7 F

## 2020-03-21 PROBLEM — Z95.5 S/P CORONARY ARTERY STENT PLACEMENT: Chronic | Status: ACTIVE | Noted: 2020-03-21

## 2020-03-21 LAB
D DIMER: <200 NG/ML DDU
EKG ATRIAL RATE: 65 BPM
EKG P AXIS: 22 DEGREES
EKG P-R INTERVAL: 124 MS
EKG Q-T INTERVAL: 394 MS
EKG QRS DURATION: 90 MS
EKG QTC CALCULATION (BAZETT): 409 MS
EKG R AXIS: -14 DEGREES
EKG T AXIS: 42 DEGREES
EKG VENTRICULAR RATE: 65 BPM
TROPONIN: 0.03 NG/ML (ref 0–0.03)

## 2020-03-21 PROCEDURE — 6370000000 HC RX 637 (ALT 250 FOR IP): Performed by: INTERNAL MEDICINE

## 2020-03-21 PROCEDURE — 99244 OFF/OP CNSLTJ NEW/EST MOD 40: CPT | Performed by: INTERNAL MEDICINE

## 2020-03-21 PROCEDURE — 36415 COLL VENOUS BLD VENIPUNCTURE: CPT

## 2020-03-21 PROCEDURE — 85378 FIBRIN DEGRADE SEMIQUANT: CPT

## 2020-03-21 PROCEDURE — 2580000003 HC RX 258: Performed by: INTERNAL MEDICINE

## 2020-03-21 PROCEDURE — 84484 ASSAY OF TROPONIN QUANT: CPT

## 2020-03-21 PROCEDURE — G0378 HOSPITAL OBSERVATION PER HR: HCPCS

## 2020-03-21 PROCEDURE — 93010 ELECTROCARDIOGRAM REPORT: CPT | Performed by: INTERNAL MEDICINE

## 2020-03-21 RX ADMIN — TICAGRELOR 90 MG: 90 TABLET ORAL at 09:56

## 2020-03-21 RX ADMIN — PANTOPRAZOLE SODIUM 40 MG: 40 TABLET, DELAYED RELEASE ORAL at 09:52

## 2020-03-21 RX ADMIN — FLUTICASONE PROPIONATE 1 SPRAY: 50 SPRAY, METERED NASAL at 09:52

## 2020-03-21 RX ADMIN — CARVEDILOL 3.12 MG: 3.12 TABLET, FILM COATED ORAL at 09:52

## 2020-03-21 RX ADMIN — LISINOPRIL 5 MG: 5 TABLET ORAL at 09:51

## 2020-03-21 RX ADMIN — Medication 10 ML: at 09:53

## 2020-03-21 RX ADMIN — ASPIRIN 81 MG: 81 TABLET, COATED ORAL at 09:51

## 2020-03-21 RX ADMIN — ATORVASTATIN CALCIUM 80 MG: 40 TABLET, FILM COATED ORAL at 09:52

## 2020-03-21 NOTE — PROGRESS NOTES
[facility] cardiology notified of consultation    Dr. Enoc Aranda contacted regarding diet order for the patient; patient requesting shower at this time.  New orders received

## 2020-03-21 NOTE — PLAN OF CARE
Problem: Pain:  Goal: Pain level will decrease  Description: Pain level will decrease     3/21/2020 0301 by Eldon Cruz RN  Outcome: Met This Shift  3/20/2020 2021 by Chaz Bustamante RN  Outcome: Met This Shift

## 2020-03-21 NOTE — H&P
[address] [address]                              HISTORY AND PHYSICAL    PATIENT NAME: Gely Thomas                     :        1970  MED REC NO:   [id number]                            ROOM:       0402  ACCOUNT NO:   [de-identified]                           ADMIT DATE: 2020  PROVIDER:     Columba Hough DO    DATE OF ADMISSION:  2020    CHIEF COMPLAINT:  Chest pain. HISTORY OF PRESENT ILLNESS:  The patient is a 14-year-old  male  with a past medical history significant for coronary artery disease,  status post coronary artery stent on 2019, who presented to the  emergency room complaining of a chest pain. He states he was at work  about 01:00 p.m. the day of admission. He was sitting in the past.  He  had a headache. He took an Advil, then he felt chest pain, left arm  numbness, and diaphoresis without associated shortness of breath, fever,  chills, or cough. He presented to the emergency room and was assigned  to observation status for further evaluation and treatment. PAST MEDICAL HISTORY:  MI, coronary artery disease, hyperlipidemia, and  environmental allergies. MEDICATIONS PRIOR TO ADMISSION:  Zinc, vitamin B12, vitamin D, cinnamon,  Brilinta, Protonix, Prinivil, Coreg, Lipitor, aspirin, and Singulair. PRIMARY CARE PHYSICIAN:  Dr. Nancy Morrow. PAST SURGICAL HISTORY:  Low back surgery, right arm fracture surgery,  hernia repair x3, and wisdom teeth extraction. SOCIAL HISTORY:  The patient quit tobacco.  Admits to minimal alcohol. REVIEW OF SYSTEMS:  Remarkable for above-stated chief complaint plus  intermittent heartburn x1 week. ALLERGIES:  To SULFA and MORPHINE.    PHYSICAL EXAMINATION:  GENERAL APPEARANCE:  Physical exam reveals a 14-year-old  male,  who is alert and oriented x3, cooperative, and a good historian.   VITAL SIGNS:  On admission; temperature 98 degrees, pulse 72,  respirations 16, blood pressure 139/82. HEENT:  Head:  Normocephalic and atraumatic. Eyes:  Pupils equal and  reactive to light. Extraocular muscles intact. Fundi not well  visualized. Nose:  No obstruction, polyp, or discharge noted. Mouth:   Mucosa without lesion. Pharynx:  Non-injectable without exudate. NECK:  Supple. No JVD. No thyromegaly. No carotid bruits. HEART:  Regular rate and rhythm without murmur. LUNGS:  Clear to auscultation bilaterally. ABDOMEN:  Positive bowel sounds, soft, and nontender. No rebound or  guarding. No hepatosplenomegaly. No masses. BACK:  Intact without lesion. EXTREMITIES:  Without edema. LYMPH NODES:  No adenopathy noticed. SKIN:  Without rash or lesion. IMPRESSION:  Chest pain. Coronary artery disease, status post coronary  artery stent on 08/03/2019. Hyperlipidemia and history of MI. PLAN:  1. Assign the patient to observation status. 2.  Cardiac telemetry. 3.  Cycle cardiac enzymes. 4.  Cardiology to see. DISCHARGE PLAN:  Home when stable.         Luis Landry DO    D: 03/21/2020 7:50:32       T: 03/21/2020 8:01:23     [email or web address]  Job#: [id number]     Doc#: [id number]    CC:

## 2020-03-23 NOTE — DISCHARGE SUMMARY
[id number] [address]                               DISCHARGE SUMMARY    PATIENT NAME: Katy Fitzpatrick                     :        1970  MED REC NO:   [id number]                            ROOM:       0402  ACCOUNT NO:   [de-identified]                           ADMIT DATE: 2020  PROVIDER:     Mary Thompson DO                  [facility], [place], operated by [facility] DATE: 2020    DATE OF ADMISSION:  2020    DATE OF DISCHARGE:  2020    DISCHARGE DIAGNOSES:  1. Noncardiac chest pain. 2.  Coronary artery disease, status post coronary artery stent. 3.  History of MI.  4.  Elevated cholesterol. HOSPITAL COURSE:  The patient is a 62-year-old  male, who  presented to the hospital with complaints of chest pain. He was  assigned to observation status. He was seen by Cardiology. No advanced  cardiac diagnostic testing recommended by Cardiology. D-dimer was less  than 200. Troponin was normal.  Chest x-ray; no acute pathology. The  patient was discharge to home in stable condition with recommendation to  follow up as an outpatient with his primary care physician Dr. Saira Oliver  and call office for appointment. Follow up with Cardiology. Call  office for appointment. DISCHARGE MEDICATIONS:  As per discharge med rec, which include aspirin,  atorvastatin, vitamin B12, Brilinta, carvedilol, lisinopril, Singulair,  and Protonix.         Gio Fuller DO    D: 2020 11:04:28       T: 2020 12:28:10     [id number]  Job#: [id number]     Doc#: [id number]    CC:  Lisa Garrett

## 2020-03-25 ENCOUNTER — TELEPHONE (OUTPATIENT)
Dept: CARDIOLOGY CLINIC | Age: 50
End: 2020-03-25

## 2020-06-02 ENCOUNTER — TELEPHONE (OUTPATIENT)
Dept: PODIATRY | Age: 50
End: 2020-06-02

## 2020-06-02 RX ORDER — MELOXICAM 15 MG/1
15 TABLET ORAL DAILY
Qty: 90 TABLET | Refills: 1 | Status: SHIPPED
Start: 2020-06-02 | End: 2020-10-20 | Stop reason: SDUPTHER

## 2020-10-20 ENCOUNTER — TELEPHONE (OUTPATIENT)
Dept: PODIATRY | Age: 50
End: 2020-10-20

## 2020-10-20 RX ORDER — MELOXICAM 15 MG/1
15 TABLET ORAL DAILY
Qty: 90 TABLET | Refills: 1 | Status: SHIPPED
Start: 2020-10-20 | End: 2022-06-06 | Stop reason: SDUPTHER

## 2020-11-14 ENCOUNTER — APPOINTMENT (OUTPATIENT)
Dept: CT IMAGING | Age: 50
End: 2020-11-14
Payer: COMMERCIAL

## 2020-11-14 ENCOUNTER — HOSPITAL ENCOUNTER (EMERGENCY)
Age: 50
Discharge: HOME | End: 2020-11-14
Attending: EMERGENCY MEDICINE
Payer: COMMERCIAL

## 2020-11-14 ENCOUNTER — APPOINTMENT (OUTPATIENT)
Dept: GENERAL RADIOLOGY | Age: 50
End: 2020-11-14
Payer: COMMERCIAL

## 2020-11-14 VITALS
HEART RATE: 73 BPM | HEIGHT: 67 IN | BODY MASS INDEX: 33.23 KG/M2 | OXYGEN SATURATION: 97 % | WEIGHT: 211.7 LBS | TEMPERATURE: 97.9 F | DIASTOLIC BLOOD PRESSURE: 63 MMHG | RESPIRATION RATE: 18 BRPM | SYSTOLIC BLOOD PRESSURE: 104 MMHG

## 2020-11-14 LAB
ANION GAP SERPL CALCULATED.3IONS-SCNC: 10 MMOL/L (ref 7–16)
BASOPHILS ABSOLUTE: 0.07 E9/L (ref 0–0.2)
BASOPHILS RELATIVE PERCENT: 0.8 % (ref 0–2)
BUN BLDV-MCNC: 17 MG/DL (ref 6–20)
CALCIUM SERPL-MCNC: 8.9 MG/DL (ref 8.6–10.2)
CHLORIDE BLD-SCNC: 103 MMOL/L (ref 98–107)
CO2: 24 MMOL/L (ref 22–29)
CREAT SERPL-MCNC: 1 MG/DL (ref 0.7–1.2)
EOSINOPHILS ABSOLUTE: 0.19 E9/L (ref 0.05–0.5)
EOSINOPHILS RELATIVE PERCENT: 2.1 % (ref 0–6)
GFR AFRICAN AMERICAN: >60
GFR NON-AFRICAN AMERICAN: >60 ML/MIN/1.73
GLUCOSE BLD-MCNC: 204 MG/DL (ref 74–99)
HCT VFR BLD CALC: 40 % (ref 37–54)
HEMOGLOBIN: 13.5 G/DL (ref 12.5–16.5)
IMMATURE GRANULOCYTES #: 0.06 E9/L
IMMATURE GRANULOCYTES %: 0.7 % (ref 0–5)
LYMPHOCYTES ABSOLUTE: 2.06 E9/L (ref 1.5–4)
LYMPHOCYTES RELATIVE PERCENT: 22.6 % (ref 20–42)
MCH RBC QN AUTO: 31.5 PG (ref 26–35)
MCHC RBC AUTO-ENTMCNC: 33.8 % (ref 32–34.5)
MCV RBC AUTO: 93.2 FL (ref 80–99.9)
MONOCYTES ABSOLUTE: 0.83 E9/L (ref 0.1–0.95)
MONOCYTES RELATIVE PERCENT: 9.1 % (ref 2–12)
NEUTROPHILS ABSOLUTE: 5.89 E9/L (ref 1.8–7.3)
NEUTROPHILS RELATIVE PERCENT: 64.7 % (ref 43–80)
PDW BLD-RTO: 12.8 FL (ref 11.5–15)
PLATELET # BLD: 179 E9/L (ref 130–450)
PMV BLD AUTO: 9.9 FL (ref 7–12)
POTASSIUM SERPL-SCNC: 3.7 MMOL/L (ref 3.5–5)
RBC # BLD: 4.29 E12/L (ref 3.8–5.8)
RBC # BLD: NORMAL 10*6/UL
SODIUM BLD-SCNC: 137 MMOL/L (ref 132–146)
TROPONIN: <0.01 NG/ML (ref 0–0.03)
WBC # BLD: 9.1 E9/L (ref 4.5–11.5)

## 2020-11-14 PROCEDURE — 72125 CT NECK SPINE W/O DYE: CPT

## 2020-11-14 PROCEDURE — 2580000003 HC RX 258: Performed by: EMERGENCY MEDICINE

## 2020-11-14 PROCEDURE — 80048 BASIC METABOLIC PNL TOTAL CA: CPT

## 2020-11-14 PROCEDURE — 71275 CT ANGIOGRAPHY CHEST: CPT

## 2020-11-14 PROCEDURE — 6360000004 HC RX CONTRAST MEDICATION: Performed by: RADIOLOGY

## 2020-11-14 PROCEDURE — 70450 CT HEAD/BRAIN W/O DYE: CPT

## 2020-11-14 PROCEDURE — 36415 COLL VENOUS BLD VENIPUNCTURE: CPT

## 2020-11-14 PROCEDURE — 74174 CTA ABD&PLVS W/CONTRAST: CPT

## 2020-11-14 PROCEDURE — 84484 ASSAY OF TROPONIN QUANT: CPT

## 2020-11-14 PROCEDURE — 85025 COMPLETE CBC W/AUTO DIFF WBC: CPT

## 2020-11-14 PROCEDURE — 99285 EMERGENCY DEPT VISIT HI MDM: CPT

## 2020-11-14 PROCEDURE — 71045 X-RAY EXAM CHEST 1 VIEW: CPT

## 2020-11-14 PROCEDURE — 93005 ELECTROCARDIOGRAM TRACING: CPT | Performed by: STUDENT IN AN ORGANIZED HEALTH CARE EDUCATION/TRAINING PROGRAM

## 2020-11-14 RX ORDER — 0.9 % SODIUM CHLORIDE 0.9 %
1000 INTRAVENOUS SOLUTION INTRAVENOUS ONCE
Status: COMPLETED | OUTPATIENT
Start: 2020-11-14 | End: 2020-11-14

## 2020-11-14 RX ORDER — SODIUM CHLORIDE 0.9 % (FLUSH) 0.9 %
10 SYRINGE (ML) INJECTION ONCE
Status: DISCONTINUED | OUTPATIENT
Start: 2020-11-14 | End: 2020-11-14 | Stop reason: HOSPADM

## 2020-11-14 RX ADMIN — SODIUM CHLORIDE 1000 ML: 9 INJECTION, SOLUTION INTRAVENOUS at 04:03

## 2020-11-14 RX ADMIN — IOPAMIDOL 90 ML: 755 INJECTION, SOLUTION INTRAVENOUS at 03:33

## 2020-11-14 ASSESSMENT — ENCOUNTER SYMPTOMS
RHINORRHEA: 0
VOMITING: 0
COUGH: 0
DIARRHEA: 0
ABDOMINAL PAIN: 0
SHORTNESS OF BREATH: 0
WHEEZING: 0
NAUSEA: 0
EYE PAIN: 0

## 2020-11-14 ASSESSMENT — PAIN DESCRIPTION - ONSET: ONSET: SUDDEN

## 2020-11-14 ASSESSMENT — PAIN DESCRIPTION - ORIENTATION: ORIENTATION: LEFT

## 2020-11-14 ASSESSMENT — PAIN SCALES - GENERAL: PAINLEVEL_OUTOF10: 4

## 2020-11-14 ASSESSMENT — PAIN DESCRIPTION - PAIN TYPE: TYPE: ACUTE PAIN

## 2020-11-14 ASSESSMENT — PAIN DESCRIPTION - DESCRIPTORS: DESCRIPTORS: ACHING

## 2020-11-14 ASSESSMENT — PAIN DESCRIPTION - LOCATION: LOCATION: LEG

## 2020-11-14 ASSESSMENT — PAIN DESCRIPTION - FREQUENCY: FREQUENCY: CONTINUOUS

## 2020-11-14 NOTE — ED NOTES
Bed: 17  Expected date:   Expected time:   Means of arrival:   Comments:  EMS     Leda Joiner RN  11/14/20 4350

## 2020-11-14 NOTE — ED PROVIDER NOTES
Shadi Hunter is a 45 y.o. male presenting to the ED for syncope. Patient had a syncopal episode just prior to arrival.  He states he got up from sleeping and had a cramp in his left calf. He then stood up and his wife stated to EMS that he passed out for a few minutes. Patient does not remember passing out or losing consciousness. He did state he hit his head on something. He has an abrasion over his right forehead. He denies any episodes like this occurring in the past.  He does state he feels thirsty and is requesting something to drink upon arrival here. He denies any focal weakness. He denies any dizziness. He he did not experience any shortness of breath or chest pain prior to passing out. The complaint is moderate in severity. They deny alleviating or exacerbating factors to their complaint. Patient has a medical history as listed below:   Past Medical History:   Diagnosis Date    Chronic sinus complaints     Diverticulitis     Hyperlipidemia     Kidney stone      Patient Active Problem List    Diagnosis Date Noted    S/P coronary artery stent placement 03/21/2020    Hyperlipidemia LDL goal <100 12/27/2019    Chest pain 12/27/2019    CAD (coronary artery disease), native coronary artery 12/27/2019    Obesity (BMI 30-39.9) 12/27/2019    Essential hypertension 12/26/2019             Review of Systems   Constitutional: Negative for chills and fever. HENT: Negative for congestion and rhinorrhea. Eyes: Negative for pain and visual disturbance. Respiratory: Negative for cough, shortness of breath and wheezing. Cardiovascular: Negative for chest pain and leg swelling. Gastrointestinal: Negative for abdominal pain, diarrhea, nausea and vomiting. Genitourinary: Negative for dysuria, frequency and hematuria. Musculoskeletal: Positive for neck pain. Negative for arthralgias, joint swelling and neck stiffness.         Left leg cramping   Neurological: Positive for syncope and headaches. Negative for dizziness, tremors, seizures, facial asymmetry, speech difficulty, weakness, light-headedness and numbness. Physical Exam  Vitals signs and nursing note reviewed. Constitutional:       General: He is not in acute distress. Appearance: He is well-developed. HENT:      Head: Normocephalic. Comments: Superficial abrasion to right forehead, nontender to palpation     Nose: Nose normal.      Mouth/Throat:      Pharynx: Oropharynx is clear. Eyes:      Extraocular Movements: Extraocular movements intact. Conjunctiva/sclera: Conjunctivae normal.      Pupils: Pupils are equal, round, and reactive to light. Neck:      Musculoskeletal: Normal range of motion. Thyroid: No thyromegaly. Vascular: No JVD. Comments: Tender to palpation to right cervical musculature  Cardiovascular:      Rate and Rhythm: Normal rate and regular rhythm. Heart sounds: Normal heart sounds. Pulmonary:      Effort: Pulmonary effort is normal. No respiratory distress. Breath sounds: Normal breath sounds. No wheezing, rhonchi or rales. Chest:      Chest wall: No tenderness. Abdominal:      General: Abdomen is flat. There is no distension. Palpations: Abdomen is soft. There is no mass. Tenderness: There is no abdominal tenderness. There is no guarding or rebound. Comments: Ecchymosis present superior to umbilicus. Musculoskeletal:         General: No swelling, tenderness or deformity. Right lower leg: No edema. Left lower leg: No edema. Skin:     General: Skin is warm and dry. Findings: No rash. Neurological:      General: No focal deficit present. Mental Status: He is alert and oriented to person, place, and time. Cranial Nerves: No cranial nerve deficit. Sensory: No sensory deficit. Motor: No weakness.       Coordination: Coordination normal.          Procedures     [facility]     ED Course as of Nov 14 1507   Sat Nov 14, 2020 0155 EKG read by me. Normal sinus rhythm. Low voltage EKG. No STEMI. Septal infarct, age undetermined. When compared to prior EKG No significant change was found    [WL]   515 [address] syncope rule resulted in patient is low risk group for serious outcome.    [WL]   0502 Patient was offered admission, he stated he was fine and wanted to go home.    [WL]   4852 Patient was ambulated here in the department and tolerated it well. [WL]      ED Course User Index  [WL] Brandie Altamirano DO        Patient presents to the ED for evaluation. This patient was seen and evaluated with the attending. Work-up was performed with concerns for but not limited to ACS, pneumonia, CVA, intracranial hemorrhage, mass, aneurysm and Electrolyte abnormality  Labs and imaging reviewed. No fractures and was dry on exam so was given IV fluids. He is elated here and tolerated well. He was offered admission but declined. Patient continues to be non-toxic on re-evaluation. Findings were discussed with the patient and reasons to immediately return to the ED were articulated to them. They will follow-up with their PMD.      --------------------------------------------- PAST HISTORY ---------------------------------------------  Past Medical History:  has a past medical history of Chronic sinus complaints, Diverticulitis, Hyperlipidemia, and Kidney stone. Past Surgical History:  has a past surgical history that includes hernia repair and back surgery. Social History:  reports that he quit smoking about 22 months ago. His smoking use included cigarettes. He started smoking about 33 years ago. He has a 36.00 pack-year smoking history. He has never used smokeless tobacco. He reports current alcohol use. He reports that he does not use drugs. Family History: family history includes Heart Attack in his father. The patients home medications have been reviewed.     Allergies: Codeine; Morphine; and Sulfa antibiotics    -------------------------------------------------- RESULTS -------------------------------------------------  Labs:  Results for orders placed or performed during the hospital encounter of 11/14/20   CBC Auto Differential   Result Value Ref Range    WBC 9.1 4.5 - 11.5 E9/L    RBC 4.29 3.80 - 5.80 E12/L    Hemoglobin 13.5 12.5 - 16.5 g/dL    Hematocrit 40.0 37.0 - 54.0 %    MCV 93.2 80.0 - 99.9 fL    MCH 31.5 26.0 - 35.0 pg    MCHC 33.8 32.0 - 34.5 %    RDW 12.8 11.5 - 15.0 fL    Platelets 589 621 - 476 E9/L    MPV 9.9 7.0 - 12.0 fL    Neutrophils % 64.7 43.0 - 80.0 %    Immature Granulocytes % 0.7 0.0 - 5.0 %    Lymphocytes % 22.6 20.0 - 42.0 %    Monocytes % 9.1 2.0 - 12.0 %    Eosinophils % 2.1 0.0 - 6.0 %    Basophils % 0.8 0.0 - 2.0 %    Neutrophils Absolute 5.89 1.80 - 7.30 E9/L    Immature Granulocytes # 0.06 E9/L    Lymphocytes Absolute 2.06 1.50 - 4.00 E9/L    Monocytes Absolute 0.83 0.10 - 0.95 E9/L    Eosinophils Absolute 0.19 0.05 - 0.50 E9/L    Basophils Absolute 0.07 0.00 - 0.20 E9/L    RBC Morphology Normal    Basic Metabolic Panel   Result Value Ref Range    Sodium 137 132 - 146 mmol/L    Potassium 3.7 3.5 - 5.0 mmol/L    Chloride 103 98 - 107 mmol/L    CO2 24 22 - 29 mmol/L    Anion Gap 10 7 - 16 mmol/L    Glucose 204 (H) 74 - 99 mg/dL    BUN 17 6 - 20 mg/dL    CREATININE 1.0 0.7 - 1.2 mg/dL    GFR Non-African American >60 >=60 mL/min/1.73    GFR African American >60     Calcium 8.9 8.6 - 10.2 mg/dL   Troponin   Result Value Ref Range    Troponin <0.01 0.00 - 0.03 ng/mL   EKG 12 Lead   Result Value Ref Range    Ventricular Rate 70 BPM    Atrial Rate 70 BPM    P-R Interval 138 ms    QRS Duration 92 ms    Q-T Interval 392 ms    QTc Calculation (Bazett) 423 ms    P Axis 40 degrees    R Axis -14 degrees    T Axis 19 degrees       Radiology:  CT HEAD WO CONTRAST   Final Result   No acute intracranial abnormality.          CT CERVICAL SPINE WO CONTRAST   Final Result   No acute abnormality of the cervical spine. CTA CHEST W CONTRAST   Final Result   No evidence of pulmonary embolism or acute pulmonary abnormality. No evidence of thoracic aortic aneurysm or dissection. CTA ABDOMEN PELVIS W CONTRAST   Final Result   Left hemicolon diverticulosis without evidence of diverticulitis. Otherwise essentially unremarkable CTA of the abdomen and pelvis. XR CHEST PORTABLE   Final Result   Negative portable chest.             ------------------------- NURSING NOTES AND VITALS REVIEWED ---------------------------  Date / Time Roomed:  11/14/2020  1:12 AM  ED Bed Assignment:  17/17    The nursing notes within the ED encounter and vital signs as below have been reviewed. /63   Pulse 73   Temp 97.9 °F (36.6 °C)   Resp 18   Ht 5' 7\" (1.702 m)   Wt 211 lb 11.2 oz (96 kg)   SpO2 97%   BMI 33.16 kg/m²           --------------------------------- ADDITIONAL PROVIDER NOTES ---------------------------------  At this time the patient is without objective evidence of an acute process requiring hospitalization or inpatient management. They have remained hemodynamically stable throughout their entire ED visit and are stable for discharge with outpatient follow-up. The plan has been discussed in detail and they are aware of the specific conditions for emergent return, as well as the importance of follow-up. Discharge Medication List as of 11/14/2020  4:48 AM          Diagnosis:  1. Left leg pain    2. Syncope and collapse        Disposition:  Patient's disposition: Discharge  Patient's condition is stable. NOTE:  This report was transcribed using voice recognition software. Efforts were made to ensure accuracy; however, inadvertent computerized transcription errors may be present.          Kade Dahl DO  Resident  11/14/20 [id number]

## 2020-11-15 LAB
EKG ATRIAL RATE: 70 BPM
EKG P AXIS: 40 DEGREES
EKG P-R INTERVAL: 138 MS
EKG Q-T INTERVAL: 392 MS
EKG QRS DURATION: 92 MS
EKG QTC CALCULATION (BAZETT): 423 MS
EKG R AXIS: -14 DEGREES
EKG T AXIS: 19 DEGREES
EKG VENTRICULAR RATE: 70 BPM

## 2021-05-06 ENCOUNTER — OFFICE VISIT (OUTPATIENT)
Dept: FAMILY MEDICINE CLINIC | Age: 51
End: 2021-05-06
Payer: COMMERCIAL

## 2021-05-06 VITALS
HEIGHT: 67 IN | DIASTOLIC BLOOD PRESSURE: 68 MMHG | BODY MASS INDEX: 32.62 KG/M2 | TEMPERATURE: 96.8 F | HEART RATE: 63 BPM | RESPIRATION RATE: 18 BRPM | SYSTOLIC BLOOD PRESSURE: 108 MMHG | OXYGEN SATURATION: 95 % | WEIGHT: 207.8 LBS

## 2021-05-06 DIAGNOSIS — J02.0 ACUTE STREPTOCOCCAL PHARYNGITIS: Primary | ICD-10-CM

## 2021-05-06 DIAGNOSIS — J01.10 ACUTE NON-RECURRENT FRONTAL SINUSITIS: ICD-10-CM

## 2021-05-06 LAB — S PYO AG THROAT QL: NORMAL

## 2021-05-06 PROCEDURE — 99213 OFFICE O/P EST LOW 20 MIN: CPT | Performed by: FAMILY MEDICINE

## 2021-05-06 PROCEDURE — 87880 STREP A ASSAY W/OPTIC: CPT | Performed by: FAMILY MEDICINE

## 2021-05-06 RX ORDER — AMOXICILLIN 250 MG/1
250 CAPSULE ORAL 3 TIMES DAILY
Qty: 30 CAPSULE | Refills: 0 | Status: SHIPPED | OUTPATIENT
Start: 2021-05-06 | End: 2021-05-16

## 2021-05-06 RX ORDER — CETIRIZINE HYDROCHLORIDE 10 MG/1
10 TABLET ORAL DAILY
Qty: 30 TABLET | Refills: 1 | Status: SHIPPED | OUTPATIENT
Start: 2021-05-06

## 2021-05-06 RX ORDER — FLUTICASONE PROPIONATE 50 MCG
1 SPRAY, SUSPENSION (ML) NASAL DAILY
Qty: 1 BOTTLE | Refills: 1 | Status: SHIPPED | OUTPATIENT
Start: 2021-05-06

## 2021-05-06 ASSESSMENT — ENCOUNTER SYMPTOMS
SINUS PAIN: 1
SINUS PRESSURE: 1
RESPIRATORY NEGATIVE: 1
SORE THROAT: 1
RHINORRHEA: 1

## 2021-05-06 NOTE — PROGRESS NOTES
21  Joesph Ortiz : 1970 Sex: male  Age: 45 y.o. Chief Complaint   Patient presents with    Sinus Problem    Congestion    Drainage       Patient is a 59-year-old white male with a chief complaint of sinus congestion drainage sore throat no fever chills cough or shortness of breath. He has had Covid and has had both immunizations for Covid. He has no hemoptysis. Review of Systems   Constitutional: Negative. HENT: Positive for congestion, postnasal drip, rhinorrhea, sinus pressure, sinus pain, sneezing and sore throat. Respiratory: Negative. Cardiovascular: Negative.           Current Outpatient Medications:     cetirizine (ZYRTEC) 10 MG tablet, Take 1 tablet by mouth daily, Disp: 30 tablet, Rfl: 1    fluticasone (FLONASE ALLERGY RELIEF) 50 MCG/ACT nasal spray, 1 spray by Each Nostril route daily, Disp: 1 Bottle, Rfl: 1    amoxicillin (AMOXIL) 250 MG capsule, Take 1 capsule by mouth 3 times daily for 10 days, Disp: 30 capsule, Rfl: 0    meloxicam (MOBIC) 15 MG tablet, Take 1 tablet by mouth daily, Disp: 90 tablet, Rfl: 1    Cyanocobalamin (B-12) 5000 MCG CAPS, Take 1 capsule by mouth daily, Disp: , Rfl:     BRILINTA 90 MG TABS tablet, Take 90 mg by mouth 2 times daily , Disp: , Rfl:     lisinopril (PRINIVIL;ZESTRIL) 5 MG tablet, Take 5 mg by mouth daily , Disp: , Rfl:     ONETOUCH DELICA LANCETS 69R MISC, , Disp: , Rfl:     ONE TOUCH ULTRA TEST strip, , Disp: , Rfl:     carvedilol (COREG) 3.125 MG tablet, Take 3.125 mg by mouth 2 times daily (with meals) , Disp: , Rfl:     aspirin 81 MG EC tablet, , Disp: , Rfl:     montelukast (SINGULAIR) 10 MG tablet, Take 10 mg by mouth nightly, Disp: , Rfl:     pantoprazole (PROTONIX) 40 MG tablet, Take 40 mg by mouth daily , Disp: , Rfl:     atorvastatin (LIPITOR) 80 MG tablet, , Disp: , Rfl:   Allergies   Allergen Reactions    Codeine     Morphine     Sulfa Antibiotics        Past Medical History:   Diagnosis Date    Chronic sinus complaints     Diverticulitis     Hyperlipidemia     Kidney stone      Past Surgical History:   Procedure Laterality Date    BACK SURGERY      HERNIA REPAIR       Family History   Problem Relation Age of Onset    Heart Attack Father      Social History     Tobacco Use    Smoking status: Former Smoker     Packs/day: 2.00     Years: 18.00     Pack years: 36.00     Types: Cigarettes     Start date: 3/20/1987     Quit date: 2019     Years since quittin.3    Smokeless tobacco: Never Used   Substance Use Topics    Alcohol use: Yes     Comment: Social. No coffee. Does drink 3 120z mountain dew a day    Drug use: No        Vitals:    21 1009   BP: 108/68   Pulse: 63   Resp: 18   Temp: 96.8 °F (36 °C)   SpO2: 95%   Weight: 207 lb 12.8 oz (94.3 kg)   Height: 5' 7\" (1.702 m)       Physical Exam  Vitals signs reviewed. Constitutional:       Appearance: Normal appearance. HENT:      Head: Normocephalic and atraumatic. Right Ear: Tympanic membrane, ear canal and external ear normal. There is no impacted cerumen. Left Ear: Tympanic membrane, ear canal and external ear normal. There is no impacted cerumen. Nose: Congestion and rhinorrhea present. Mouth/Throat:      Mouth: Mucous membranes are dry. Pharynx: Oropharyngeal exudate and posterior oropharyngeal erythema present. Eyes:      Conjunctiva/sclera: Conjunctivae normal.   Cardiovascular:      Rate and Rhythm: Normal rate and regular rhythm. Heart sounds: No murmur. Pulmonary:      Effort: Pulmonary effort is normal.      Breath sounds: Normal breath sounds. Lymphadenopathy:      Cervical: No cervical adenopathy. Neurological:      Mental Status: He is alert. Assessment and Plan:  Bayron Cheng was seen today for sinus problem, congestion and drainage.     Diagnoses and all orders for this visit:    Acute streptococcal pharyngitis  -     POCT rapid strep A    Acute non-recurrent frontal sinusitis  - POCT rapid strep A    Other orders  -     cetirizine (ZYRTEC) 10 MG tablet; Take 1 tablet by mouth daily  -     fluticasone (FLONASE ALLERGY RELIEF) 50 MCG/ACT nasal spray; 1 spray by Each Nostril route daily  -     amoxicillin (AMOXIL) 250 MG capsule; Take 1 capsule by mouth 3 times daily for 10 days        Orders Placed This Encounter   Medications    cetirizine (ZYRTEC) 10 MG tablet     Sig: Take 1 tablet by mouth daily     Dispense:  30 tablet     Refill:  1    fluticasone (FLONASE ALLERGY RELIEF) 50 MCG/ACT nasal spray     Si spray by Each Nostril route daily     Dispense:  1 Bottle     Refill:  1    amoxicillin (AMOXIL) 250 MG capsule     Sig: Take 1 capsule by mouth 3 times daily for 10 days     Dispense:  30 capsule     Refill:  0        Patient advised to follow up with PCP as needed. Seen By:  Antony Baumann DO  Re: Doing rapid strep. Medications were prescribed and he is to follow-up with his PCP.

## 2021-07-01 ENCOUNTER — OFFICE VISIT (OUTPATIENT)
Dept: FAMILY MEDICINE CLINIC | Age: 51
End: 2021-07-01
Payer: COMMERCIAL

## 2021-07-01 VITALS
OXYGEN SATURATION: 98 % | HEART RATE: 70 BPM | DIASTOLIC BLOOD PRESSURE: 68 MMHG | RESPIRATION RATE: 18 BRPM | SYSTOLIC BLOOD PRESSURE: 114 MMHG | HEIGHT: 67 IN | TEMPERATURE: 96.5 F | WEIGHT: 206.6 LBS | BODY MASS INDEX: 32.43 KG/M2

## 2021-07-01 DIAGNOSIS — R42 DIZZY: Primary | ICD-10-CM

## 2021-07-01 DIAGNOSIS — H83.03 LABYRINTHITIS OF BOTH EARS: ICD-10-CM

## 2021-07-01 PROCEDURE — 99214 OFFICE O/P EST MOD 30 MIN: CPT | Performed by: FAMILY MEDICINE

## 2021-07-01 RX ORDER — MECLIZINE HCL 12.5 MG/1
12.5 TABLET ORAL 3 TIMES DAILY PRN
Qty: 20 TABLET | Refills: 0 | Status: SHIPPED | OUTPATIENT
Start: 2021-07-01

## 2021-07-01 RX ORDER — METHYLPREDNISOLONE 4 MG/1
TABLET ORAL
Qty: 1 KIT | Refills: 0 | Status: SHIPPED | OUTPATIENT
Start: 2021-07-01 | End: 2022-01-03

## 2021-07-01 ASSESSMENT — ENCOUNTER SYMPTOMS
SINUS PAIN: 1
EYES NEGATIVE: 1
RESPIRATORY NEGATIVE: 1
RHINORRHEA: 1

## 2021-07-01 NOTE — PROGRESS NOTES
21  Jair Merida : 1970 Sex: male  Age: 45 y.o. Chief Complaint   Patient presents with    Dizziness     patient not sure if it is due to sinus, ear ache, bp was fine this am when taken. Is a 50-year-old male who woke up this morning with dizziness he feels like he is spinning. He has had no nausea vomiting only the dizziness he has had a similar episodes in the past.  Vital signs are stable. He has had no focal neurologic symptoms or signs no chest pain no fluttering in his chest.      Review of Systems   Constitutional: Negative. HENT: Positive for congestion, postnasal drip, rhinorrhea and sinus pain. Eyes: Negative. Respiratory: Negative. Cardiovascular: Negative. Neurological: Negative.           Current Outpatient Medications:     methylPREDNISolone (MEDROL DOSEPACK) 4 MG tablet, Take by mouth., Disp: 1 kit, Rfl: 0    meclizine (ANTIVERT) 12.5 MG tablet, Take 1 tablet by mouth 3 times daily as needed for Dizziness, Disp: 20 tablet, Rfl: 0    cetirizine (ZYRTEC) 10 MG tablet, Take 1 tablet by mouth daily, Disp: 30 tablet, Rfl: 1    fluticasone (FLONASE ALLERGY RELIEF) 50 MCG/ACT nasal spray, 1 spray by Each Nostril route daily, Disp: 1 Bottle, Rfl: 1    meloxicam (MOBIC) 15 MG tablet, Take 1 tablet by mouth daily, Disp: 90 tablet, Rfl: 1    Cyanocobalamin (B-12) 5000 MCG CAPS, Take 1 capsule by mouth daily, Disp: , Rfl:     BRILINTA 90 MG TABS tablet, Take 90 mg by mouth 2 times daily , Disp: , Rfl:     pantoprazole (PROTONIX) 40 MG tablet, Take 40 mg by mouth daily , Disp: , Rfl:     lisinopril (PRINIVIL;ZESTRIL) 5 MG tablet, Take 5 mg by mouth daily , Disp: , Rfl:     ONETOUCH DELICA LANCETS 99U MISC, , Disp: , Rfl:     ONE TOUCH ULTRA TEST strip, , Disp: , Rfl:     carvedilol (COREG) 3.125 MG tablet, Take 3.125 mg by mouth 2 times daily (with meals) , Disp: , Rfl:     atorvastatin (LIPITOR) 80 MG tablet, , Disp: , Rfl:     aspirin 81 MG EC tablet, , Disp: , Rfl:     montelukast (SINGULAIR) 10 MG tablet, Take 10 mg by mouth nightly, Disp: , Rfl:   Allergies   Allergen Reactions    Codeine     Morphine     Sulfa Antibiotics        Past Medical History:   Diagnosis Date    Chronic sinus complaints     Diverticulitis     Hyperlipidemia     Kidney stone      Past Surgical History:   Procedure Laterality Date    BACK SURGERY      HERNIA REPAIR       Family History   Problem Relation Age of Onset    Heart Attack Father      Social History     Tobacco Use    Smoking status: Former Smoker     Packs/day: 2.00     Years: 18.00     Pack years: 36.00     Types: Cigarettes     Start date: 3/20/1987     Quit date: 2019     Years since quittin.4    Smokeless tobacco: Never Used   Vaping Use    Vaping Use: Never used   Substance Use Topics    Alcohol use: Yes     Comment: Social. No coffee. Does drink 3 120z mountain dew a day    Drug use: No        Vitals:    21 0846   BP: 114/68   Pulse: 70   Resp: 18   Temp: 96.5 °F (35.8 °C)   SpO2: 98%   Weight: 206 lb 9.6 oz (93.7 kg)   Height: 5' 7\" (1.702 m)       Physical Exam  Vitals and nursing note reviewed. Constitutional:       Appearance: Normal appearance. HENT:      Head: Normocephalic and atraumatic. Right Ear: Hearing, tympanic membrane, ear canal and external ear normal. There is no impacted cerumen. Left Ear: Hearing, tympanic membrane, ear canal and external ear normal. There is no impacted cerumen. Nose: Congestion present. Mouth/Throat:      Mouth: Mucous membranes are moist.      Pharynx: Oropharynx is clear. Eyes:      Extraocular Movements: Extraocular movements intact. Conjunctiva/sclera: Conjunctivae normal.      Pupils: Pupils are equal, round, and reactive to light. Cardiovascular:      Rate and Rhythm: Normal rate and regular rhythm. Heart sounds: No murmur heard.      Pulmonary:      Effort: Pulmonary effort is normal.      Breath sounds: Normal breath sounds. Musculoskeletal:      Cervical back: No rigidity. Neurological:      General: No focal deficit present. Mental Status: He is alert and oriented to person, place, and time. Cranial Nerves: No cranial nerve deficit or dysarthria. Sensory: No sensory deficit. Motor: No weakness, tremor, abnormal muscle tone, seizure activity or pronator drift. Coordination: Romberg sign negative. Coordination normal. Finger-Nose-Finger Test and Heel to [facility] Test normal.      Gait: Gait and tandem walk normal.      Deep Tendon Reflexes: Reflexes normal.         Assessment and Plan:  Chin Neal was seen today for dizziness. Diagnoses and all orders for this visit:    Dizzy    Labyrinthitis of both ears    Other orders  -     methylPREDNISolone (MEDROL DOSEPACK) 4 MG tablet; Take by mouth. -     meclizine (ANTIVERT) 12.5 MG tablet; Take 1 tablet by mouth 3 times daily as needed for Dizziness        Orders Placed This Encounter   Medications    methylPREDNISolone (MEDROL DOSEPACK) 4 MG tablet     Sig: Take by mouth. Dispense:  1 kit     Refill:  0    meclizine (ANTIVERT) 12.5 MG tablet     Sig: Take 1 tablet by mouth 3 times daily as needed for Dizziness     Dispense:  20 tablet     Refill:  0        Patient advised to follow up with PCP as needed. Seen By:  Jennifer Armstrong, DO  Admission elicits the dizziness patient has labyrinthitis we will treat with Zyrtec which she has Medrol Dosepak and Antivert 12.5 mg as needed. Follow-up with his PCP.

## 2021-07-21 ENCOUNTER — OFFICE VISIT (OUTPATIENT)
Dept: FAMILY MEDICINE CLINIC | Age: 51
End: 2021-07-21
Payer: COMMERCIAL

## 2021-07-21 VITALS
BODY MASS INDEX: 31.79 KG/M2 | WEIGHT: 203 LBS | DIASTOLIC BLOOD PRESSURE: 62 MMHG | HEART RATE: 67 BPM | OXYGEN SATURATION: 97 % | SYSTOLIC BLOOD PRESSURE: 126 MMHG | TEMPERATURE: 98.1 F

## 2021-07-21 DIAGNOSIS — R09.89 CHEST CONGESTION: ICD-10-CM

## 2021-07-21 DIAGNOSIS — J10.1 INFLUENZA A: Primary | ICD-10-CM

## 2021-07-21 LAB
INFLUENZA A ANTIBODY: POSITIVE
INFLUENZA B ANTIBODY: NORMAL
Lab: NORMAL
PERFORMING INSTRUMENT: NORMAL
QC PASS/FAIL: NORMAL
SARS-COV-2, POC: NORMAL

## 2021-07-21 PROCEDURE — 87804 INFLUENZA ASSAY W/OPTIC: CPT | Performed by: FAMILY MEDICINE

## 2021-07-21 PROCEDURE — 99213 OFFICE O/P EST LOW 20 MIN: CPT | Performed by: FAMILY MEDICINE

## 2021-07-21 PROCEDURE — 87426 SARSCOV CORONAVIRUS AG IA: CPT | Performed by: FAMILY MEDICINE

## 2021-07-21 RX ORDER — GUAIFENESIN 600 MG/1
1200 TABLET, EXTENDED RELEASE ORAL 2 TIMES DAILY
Qty: 40 TABLET | Refills: 0 | Status: SHIPPED | OUTPATIENT
Start: 2021-07-21 | End: 2021-07-31

## 2021-07-21 RX ORDER — PREDNISONE 10 MG/1
10 TABLET ORAL 2 TIMES DAILY
Qty: 10 TABLET | Refills: 0 | Status: SHIPPED | OUTPATIENT
Start: 2021-07-21 | End: 2021-07-26

## 2021-07-21 RX ORDER — ALBUTEROL SULFATE 90 UG/1
2 AEROSOL, METERED RESPIRATORY (INHALATION) 4 TIMES DAILY PRN
Qty: 3 INHALER | Refills: 1 | Status: SHIPPED | OUTPATIENT
Start: 2021-07-21

## 2021-07-21 ASSESSMENT — ENCOUNTER SYMPTOMS
WHEEZING: 1
EYES NEGATIVE: 1
COUGH: 1
SINUS PRESSURE: 1
TROUBLE SWALLOWING: 0
RHINORRHEA: 1
SORE THROAT: 1
GASTROINTESTINAL NEGATIVE: 1
SINUS PAIN: 1

## 2021-07-21 NOTE — PROGRESS NOTES
Barbara Sanchez (:  1970) is a 45 y.o. male,Established patient, here for evaluation of the following chief complaint(s):  Cough and Chest Congestion         ASSESSMENT/PLAN:  1. Influenza A  -     predniSONE (DELTASONE) 10 MG tablet; Take 1 tablet by mouth 2 times daily for 5 days, Disp-10 tablet, R-0Normal  -     albuterol sulfate HFA (VENTOLIN HFA) 108 (90 Base) MCG/ACT inhaler; Inhale 2 puffs into the lungs 4 times daily as needed for Wheezing, Disp-3 Inhaler, R-1Normal  -     guaiFENesin (MUCINEX) 600 MG extended release tablet; Take 2 tablets by mouth 2 times daily for 10 days, Disp-40 tablet, R-0Normal  2. Chest congestion  -     POCT Influenza A/B  -     POCT COVID-19, Antigen  -     predniSONE (DELTASONE) 10 MG tablet; Take 1 tablet by mouth 2 times daily for 5 days, Disp-10 tablet, R-0Normal  -     albuterol sulfate HFA (VENTOLIN HFA) 108 (90 Base) MCG/ACT inhaler; Inhale 2 puffs into the lungs 4 times daily as needed for Wheezing, Disp-3 Inhaler, R-1Normal  -     guaiFENesin (MUCINEX) 600 MG extended release tablet; Take 2 tablets by mouth 2 times daily for 10 days, Disp-40 tablet, R-0Normal  Influenza A positive. Outside of window for Tamiflu. Treat symptomatically. Red flags discussed with patient if these occurs directly to the emergency department. Patient voiced understanding. No follow-ups on file. Subjective   SUBJECTIVE/OBJECTIVE:  HPI  Patient presents today for 2 to 3 weeks of worsening chest congestion, mild sore throat, mild fever. Patient states his grandchildren recently were positive for RSV in addition to other family members. No known exposure to influenza A in the last several weeks. Denies any recent travel. Denies any chest pain. Denies any nausea, vomiting, or diarrhea. Review of Systems   Constitutional: Positive for fever. HENT: Positive for postnasal drip, rhinorrhea, sinus pressure, sinus pain and sore throat. Negative for trouble swallowing.

## 2021-07-22 NOTE — CONSULTS
Social. No coffee. Does drink 3 120z mountain dew a day    Drug use: No    Sexual activity: Yes     Partners: Female   Lifestyle    Physical activity     Days per week: Not on file     Minutes per session: Not on file    Stress: Not on file   Relationships    Social connections     Talks on phone: Not on file     Gets together: Not on file     Attends Jain service: Not on file     Active member of club or organization: Not on file     Attends meetings of clubs or organizations: Not on file     Relationship status: Not on file    Intimate partner violence     Fear of current or ex partner: Not on file     Emotionally abused: Not on file     Physically abused: Not on file     Forced sexual activity: Not on file   Other Topics Concern    Not on file   Social History Narrative    Not on file       Family History   Problem Relation Age of Onset    Heart Attack Father        Review of Systems:   Heart: as above   Lungs: as above   Eyes: denies changes in vision or discharge. Ears: denies changes in hearing or pain. Nose: denies epistaxis or masses   Throat: denies sore throat or trouble swallowing. Neuro: denies numbness, tingling, tremors. Skin: denies rashes or itching. : denies hematuria, dysuria   GI: denies vomiting, diarrhea   Psych: denies mood changed, anxiety, depression. all others negative. Physical Exam   /67   Pulse 62   Temp 98.1 °F (36.7 °C) (Oral)   Resp 18   Ht 5' 8\" (1.727 m)   Wt 211 lb 11.2 oz (96 kg)   SpO2 98%   BMI 32.19 kg/m²   Constitutional: Oriented to person, place, and time. Well-developed and well-nourished. No distress. Head: Normocephalic and atraumatic. Eyes: EOM are normal. Pupils are equal, round, and reactive to light. Neck: Normal range of motion. Neck supple. No hepatojugular reflux and no JVD present. Carotid bruit is not present. No tracheal deviation present. No thyromegaly present.    Cardiovascular: Normal rate, regular rhythm, coronary artery stent placement     1. Chest pain/CAD: EKG no acute changes. CE serially negative. Normal stress 12/27/19. Cath at [facility] stable 1/2020 per patient. ASA/brilinta/BB/statin/ACEI. If d-dimer negative then okay to discharge patient with PPI and outpatient GI follow up. 2. HTN: Observe. 3. Lipids: Statin. Leelee Dailey D.O.   Cardiologist  Cardiology, [facility] CHEST DISCOMFORT/PALPITATIONS/SHORTNESS OF BREATH

## 2021-08-20 PROBLEM — J10.1 INFLUENZA A: Status: RESOLVED | Noted: 2021-07-21 | Resolved: 2021-08-20

## 2022-01-03 ENCOUNTER — OFFICE VISIT (OUTPATIENT)
Dept: FAMILY MEDICINE CLINIC | Age: 52
End: 2022-01-03
Payer: COMMERCIAL

## 2022-01-03 VITALS — TEMPERATURE: 97.1 F | WEIGHT: 206.2 LBS | HEART RATE: 57 BPM | OXYGEN SATURATION: 98 % | BODY MASS INDEX: 32.3 KG/M2

## 2022-01-03 DIAGNOSIS — Z20.822 SUSPECTED COVID-19 VIRUS INFECTION: Primary | ICD-10-CM

## 2022-01-03 DIAGNOSIS — J01.90 ACUTE BACTERIAL SINUSITIS: ICD-10-CM

## 2022-01-03 DIAGNOSIS — Z20.822 SUSPECTED COVID-19 VIRUS INFECTION: ICD-10-CM

## 2022-01-03 DIAGNOSIS — B96.89 ACUTE BACTERIAL SINUSITIS: ICD-10-CM

## 2022-01-03 PROCEDURE — 99213 OFFICE O/P EST LOW 20 MIN: CPT | Performed by: FAMILY MEDICINE

## 2022-01-03 RX ORDER — METHYLPREDNISOLONE 4 MG/1
TABLET ORAL
Qty: 1 KIT | Refills: 0 | Status: SHIPPED | OUTPATIENT
Start: 2022-01-03 | End: 2022-01-09

## 2022-01-03 RX ORDER — GUAIFENESIN 600 MG/1
600 TABLET, EXTENDED RELEASE ORAL 2 TIMES DAILY
Qty: 30 TABLET | Refills: 0 | Status: SHIPPED | OUTPATIENT
Start: 2022-01-03 | End: 2022-01-18

## 2022-01-03 RX ORDER — AMOXICILLIN AND CLAVULANATE POTASSIUM 875; 125 MG/1; MG/1
1 TABLET, FILM COATED ORAL 2 TIMES DAILY
Qty: 14 TABLET | Refills: 0 | Status: SHIPPED | OUTPATIENT
Start: 2022-01-03 | End: 2022-01-10

## 2022-01-03 NOTE — PROGRESS NOTES
OFFICE NOTE    1/3/22  Name: Ibis Casas  :1970   Sex:male   Age:51 y.o. SUBJECTIVE  Chief Complaint   Patient presents with    Cough     onset friday    Congestion    Headache       HPI works at [employer]. Denies fever    Review of Systems   Reports productive cough, no loss of smell or taste.  No PLUNKETT or GI symptoms      Current Outpatient Medications:     amoxicillin-clavulanate (AUGMENTIN) 875-125 MG per tablet, Take 1 tablet by mouth 2 times daily for 7 days, Disp: 14 tablet, Rfl: 0    methylPREDNISolone (MEDROL DOSEPACK) 4 MG tablet, Take by mouth., Disp: 1 kit, Rfl: 0    guaiFENesin (MUCINEX) 600 MG extended release tablet, Take 1 tablet by mouth 2 times daily for 15 days, Disp: 30 tablet, Rfl: 0    albuterol sulfate HFA (VENTOLIN HFA) 108 (90 Base) MCG/ACT inhaler, Inhale 2 puffs into the lungs 4 times daily as needed for Wheezing, Disp: 3 Inhaler, Rfl: 1    meclizine (ANTIVERT) 12.5 MG tablet, Take 1 tablet by mouth 3 times daily as needed for Dizziness, Disp: 20 tablet, Rfl: 0    cetirizine (ZYRTEC) 10 MG tablet, Take 1 tablet by mouth daily, Disp: 30 tablet, Rfl: 1    fluticasone (FLONASE ALLERGY RELIEF) 50 MCG/ACT nasal spray, 1 spray by Each Nostril route daily, Disp: 1 Bottle, Rfl: 1    meloxicam (MOBIC) 15 MG tablet, Take 1 tablet by mouth daily, Disp: 90 tablet, Rfl: 1    Cyanocobalamin (B-12) 5000 MCG CAPS, Take 1 capsule by mouth daily, Disp: , Rfl:     BRILINTA 90 MG TABS tablet, Take 90 mg by mouth 2 times daily , Disp: , Rfl:     pantoprazole (PROTONIX) 40 MG tablet, Take 40 mg by mouth daily , Disp: , Rfl:     lisinopril (PRINIVIL;ZESTRIL) 5 MG tablet, Take 5 mg by mouth daily , Disp: , Rfl:     ONETOUCH DELICA LANCETS 77F MISC, , Disp: , Rfl:     ONE TOUCH ULTRA TEST strip, , Disp: , Rfl:     carvedilol (COREG) 3.125 MG tablet, Take 3.125 mg by mouth 2 times daily (with meals) , Disp: , Rfl:     atorvastatin (LIPITOR) 80 MG tablet, , Disp: , Rfl:     aspirin Order Specific Question:   Pregnant: Answer:   No     Order Specific Question:   Previously tested for COVID-19? Answer:   Yes        EXAM   Physical Exam  Vitals and nursing note reviewed. Constitutional:       Appearance: Normal appearance. Comments: overweight   HENT:      Right Ear: Tympanic membrane normal.      Left Ear: Tympanic membrane normal.      Nose: Congestion and rhinorrhea present. Mouth/Throat:      Pharynx: Oropharynx is clear. Posterior oropharyngeal erythema present. Eyes:      General: No scleral icterus. Conjunctiva/sclera: Conjunctivae normal.   Cardiovascular:      Rate and Rhythm: Bradycardia present. Pulses: Normal pulses. Heart sounds: No murmur heard. Pulmonary:      Breath sounds: Rhonchi present. No wheezing. Musculoskeletal:      Cervical back: No tenderness. Lymphadenopathy:      Cervical: No cervical adenopathy. Neurological:      General: No focal deficit present. Mental Status: He is alert and oriented to person, place, and time. Sheyla Culp was seen today for cough, congestion and headache. Diagnoses and all orders for this visit:    Suspected COVID-19 virus infection  -     COVID-19 Ambulatory; Future    Acute bacterial sinusitis    Other orders  -     amoxicillin-clavulanate (AUGMENTIN) 875-125 MG per tablet; Take 1 tablet by mouth 2 times daily for 7 days  -     methylPREDNISolone (MEDROL DOSEPACK) 4 MG tablet; Take by mouth.  -     guaiFENesin (MUCINEX) 600 MG extended release tablet; Take 1 tablet by mouth 2 times daily for 15 days    PCR send out. Warning signs discusse      No follow-ups on file.     Electronically signed by Dilan Kirk MD on 1/3/22 at 9:28 AM EST

## 2022-01-05 LAB
SARS-COV-2: NOT DETECTED
SOURCE: NORMAL

## 2022-03-26 ENCOUNTER — OFFICE VISIT (OUTPATIENT)
Dept: PODIATRY | Age: 52
End: 2022-03-26
Payer: COMMERCIAL

## 2022-03-26 VITALS
DIASTOLIC BLOOD PRESSURE: 78 MMHG | SYSTOLIC BLOOD PRESSURE: 126 MMHG | TEMPERATURE: 98.2 F | WEIGHT: 209 LBS | BODY MASS INDEX: 32.73 KG/M2

## 2022-03-26 DIAGNOSIS — M72.2 PLANTAR FASCIAL FIBROMATOSIS: Primary | ICD-10-CM

## 2022-03-26 DIAGNOSIS — M79.674 PAIN IN TOE OF RIGHT FOOT: ICD-10-CM

## 2022-03-26 DIAGNOSIS — M77.31 CALCANEAL SPUR OF BOTH FEET: ICD-10-CM

## 2022-03-26 DIAGNOSIS — L60.0 INGROWN NAIL: ICD-10-CM

## 2022-03-26 DIAGNOSIS — M72.2 BILATERAL PLANTAR FASCIITIS: ICD-10-CM

## 2022-03-26 DIAGNOSIS — M77.32 CALCANEAL SPUR OF BOTH FEET: ICD-10-CM

## 2022-03-26 DIAGNOSIS — M79.675 PAIN IN LEFT TOE(S): ICD-10-CM

## 2022-03-26 PROCEDURE — 11750 EXCISION NAIL&NAIL MATRIX: CPT | Performed by: PODIATRIST

## 2022-03-26 PROCEDURE — 99214 OFFICE O/P EST MOD 30 MIN: CPT | Performed by: PODIATRIST

## 2022-03-26 PROCEDURE — 20550 NJX 1 TENDON SHEATH/LIGAMENT: CPT | Performed by: PODIATRIST

## 2022-03-26 NOTE — PATIENT INSTRUCTIONS
Starting day #2 soak foot in 1 Tablespoon of epsom salt and warm water for 15 minutes per day until Dr Josué Duran tells you to stop soaking. Apply Neosporin or triple antibiotic ointment and a Band-Aid.      Any Questions fell free to contact Maryann DIMAS at [phone]

## 2022-03-26 NOTE — PROGRESS NOTES
3/26/22  Court Lucero : 1970 Sex: male  Age: 45 y.o. Patient was referred by Anatoliy Mehta MD    Chief Complaint   Patient presents with    Foot Pain     right heel pain and ingrown toenail left first taking mobic     Ingrown Toenail       SUBJECTIVE patient is seen today for 2 different issues #1 right heel pain this is different than the left heel pain from last year. Right heel pain started several months ago pain more in the morning pain after sitting. No trauma noted pain on the bottom of the heel. Patient has a chronic ingrown toenail left great toe patient would like to have something done to it.   HPI  Review of Systems  Const: Denies constitutional symptoms  Musculo: Denies symptoms other than stated above  Skin: Denies symptoms other than stated above       Current Outpatient Medications:     albuterol sulfate HFA (VENTOLIN HFA) 108 (90 Base) MCG/ACT inhaler, Inhale 2 puffs into the lungs 4 times daily as needed for Wheezing, Disp: 3 Inhaler, Rfl: 1    meclizine (ANTIVERT) 12.5 MG tablet, Take 1 tablet by mouth 3 times daily as needed for Dizziness, Disp: 20 tablet, Rfl: 0    cetirizine (ZYRTEC) 10 MG tablet, Take 1 tablet by mouth daily, Disp: 30 tablet, Rfl: 1    fluticasone (FLONASE ALLERGY RELIEF) 50 MCG/ACT nasal spray, 1 spray by Each Nostril route daily, Disp: 1 Bottle, Rfl: 1    meloxicam (MOBIC) 15 MG tablet, Take 1 tablet by mouth daily, Disp: 90 tablet, Rfl: 1    Cyanocobalamin (B-12) 5000 MCG CAPS, Take 1 capsule by mouth daily, Disp: , Rfl:     BRILINTA 90 MG TABS tablet, Take 90 mg by mouth 2 times daily , Disp: , Rfl:     pantoprazole (PROTONIX) 40 MG tablet, Take 40 mg by mouth daily , Disp: , Rfl:     lisinopril (PRINIVIL;ZESTRIL) 5 MG tablet, Take 5 mg by mouth daily , Disp: , Rfl:     ONETOUCH DELICA LANCETS 80T MISC, , Disp: , Rfl:     ONE TOUCH ULTRA TEST strip, , Disp: , Rfl:     carvedilol (COREG) 3.125 MG tablet, Take 3.125 mg by mouth 2 times daily (with meals) , Disp: , Rfl:     atorvastatin (LIPITOR) 80 MG tablet, , Disp: , Rfl:     aspirin 81 MG EC tablet, , Disp: , Rfl:     montelukast (SINGULAIR) 10 MG tablet, Take 10 mg by mouth nightly, Disp: , Rfl:   Allergies   Allergen Reactions    Atorvastatin Other (See Comments)    Codeine     Morphine     Rosuvastatin      Other reaction(s): Myalgia    Sulfa Antibiotics        Past Medical History:   Diagnosis Date    Chronic sinus complaints     Diverticulitis     Hyperlipidemia     Kidney stone      Past Surgical History:   Procedure Laterality Date    BACK SURGERY      HERNIA REPAIR       Family History   Problem Relation Age of Onset    Heart Attack Father      Social History     Socioeconomic History    Marital status:      Spouse name: Not on file    Number of children: Not on file    Years of education: Not on file    Highest education level: Not on file   Occupational History    Not on file   Tobacco Use    Smoking status: Former Smoker     Packs/day: 2.00     Years: 18.00     Pack years: 36.00     Types: Cigarettes     Start date: 3/20/1987     Quit date: 1/1/2019     Years since quitting: 3.2    Smokeless tobacco: Never Used   Vaping Use    Vaping Use: Never used   Substance and Sexual Activity    Alcohol use: Yes     Comment: Social. No coffee. Does drink 3 120z mountain dew a day    Drug use: No    Sexual activity: Yes     Partners: Female   Other Topics Concern    Not on file   Social History Narrative    Not on file     Social Determinants of Health     Financial Resource Strain:     Difficulty of Paying Living Expenses: Not on file   Food Insecurity:     Worried About Running Out of Food in the Last Year: Not on file    Shama of Food in the Last Year: Not on file   Transportation Needs:     Lack of Transportation (Medical): Not on file    Lack of Transportation (Non-Medical):  Not on file   Physical Activity:     Days of Exercise per Week: Not on file  Minutes of Exercise per Session: Not on file   Stress:     Feeling of Stress : Not on file   Social Connections:     Frequency of Communication with Friends and Family: Not on file    Frequency of Social Gatherings with Friends and Family: Not on file    Attends Baptist Services: Not on file    Active Member of Clubs or Organizations: Not on file    Attends Club or Organization Meetings: Not on file    Marital Status: Not on file   Intimate Partner Violence:     Fear of Current or Ex-Partner: Not on file    Emotionally Abused: Not on file    Physically Abused: Not on file    Sexually Abused: Not on file   Housing Stability:     Unable to Pay for Housing in the Last Year: Not on file    Number of [place] in the Last Year: Not on file    Unstable Housing in the Last Year: Not on file       Vitals:    03/26/22 1033   BP: 126/78   Temp: 98.2 °F (36.8 °C)   TempSrc: Temporal   Weight: 209 lb (94.8 kg)       Focused Lower Extremity Physical Exam:  Vitals:    03/26/22 1033   BP: 126/78   Temp: 98.2 °F (36.8 °C)        Foot Exam    General  General Appearance: appears stated age and healthy   Orientation: alert and oriented to person, place, and time       Right Foot/Ankle     Inspection and Palpation  Tenderness: calcaneus tenderness, bony tenderness and plantar fascia   Swelling: plantar fascia   Skin Exam: skin intact;      Neurovascular  Dorsalis pedis: 3+  Posterior tibial: 3+  Saphenous nerve sensation: normal  Tibial nerve sensation: normal  Superficial peroneal nerve sensation: normal  Deep peroneal nerve sensation: normal  Sural nerve sensation: normal  Achilles reflex: 2+  Babinski reflex: 2+    Muscle Strength  Ankle dorsiflexion: 5  Ankle plantar flexion: 5  Ankle inversion: 5  Ankle eversion: 5  Great toe extension: 5  Great toe flexion: 5             Right Ankle Exam     Muscle Strength   Dorsiflexion:  5/5  Plantar flexion:  5/5            Vascular: pulses  dp  pt palpable  Capillary Refill Time:   Hair growth  Skin:    Edema:    Neurologic:      Musculoskeletal/ Orthopedic examination: The right heel there is pain with palpation inferior aspect of the right heel pain with palpation at the insertion of the plantar fascial muscle calcaneus. NAIL the left hallux medial border the nail is thick pitting mycotic incurvated in the skin causing pain and pressure no infection noted. Web space   Derm:         Assessment and Plan:Potential risks, complications, alternative treatments, and procedure prognosis were explained to the patient. Verbal informed consent was obtained from the patient. Chlora prep preparation was performed over plantar fascia. 1 mL of 0.5% Marcaine plain and 1cc celestone Soluspan   injected in standard medial approach plantar fascia. RIGHT   Patient tolerated steroid injection well. Band-Aid applied. The patient was educated on a possible steroid flare and the use of ice with frozen water bottle 10 minutes each night discussed. Continue passive and active range of motion stretches and strengthening bilateral plantar fascia and Achilles tendons. Jasiel Patterson was seen today for foot pain and ingrown toenail. Diagnoses and all orders for this visit:    Plantar fascial fibromatosis  -     XR FOOT RIGHT (MIN 3 VIEWS); Future    Ingrown nail    Pain in toe of right foot    Calcaneal spur of both feet    Bilateral plantar fasciitis    Pain in left toe(s)    Matrixectomy phenol alcohol    Verbal and signed informed consent were obtained from the patient. Located on the left foot. No infection is present. The affected nail(s) have been chronically incurvated. After sterile prep was performed, total matrixectomy was performed under local anesthesia. Lidocaine 1% injected without difficulty. The nail was split down to the eponychium left medial great toe. The eponychial area was removed and the matrix cells were then cauterized phenol 3 application for 45 seconds.   The area was flushed with alcohol. The area was dressed with silvadene and coban dressing. The patient was given sign and symptoms of infection and was instructed to call and com in immediately if any problems and/or come in immediately if any problems and/or questions arise. The patient will return to the office in one week for a check. No follow-ups on file. Seen By:  Kathy Sparks DPM      Document was created using voice recognition software. Note was reviewed, however may contain grammatical errors.

## 2022-04-04 RX ORDER — BETAMETHASONE SODIUM PHOSPHATE AND BETAMETHASONE ACETATE 3; 3 MG/ML; MG/ML
4 INJECTION, SUSPENSION INTRA-ARTICULAR; INTRALESIONAL; INTRAMUSCULAR; SOFT TISSUE ONCE
Status: DISCONTINUED | OUTPATIENT
Start: 2022-04-04 | End: 2022-04-18

## 2022-04-04 RX ORDER — BUPIVACAINE HYDROCHLORIDE 5 MG/ML
1 INJECTION, SOLUTION PERINEURAL ONCE
Status: DISCONTINUED | OUTPATIENT
Start: 2022-04-04 | End: 2022-04-18

## 2022-04-04 NOTE — ADDENDUM NOTE
Addended by: Bettye Lopez on: 4/4/2022 08:05 AM     Modules accepted: Orders Warfarin 3mg tablets refilled

## 2022-04-11 ENCOUNTER — TELEPHONE (OUTPATIENT)
Dept: ADMINISTRATIVE | Age: 52
End: 2022-04-11

## 2022-04-11 NOTE — TELEPHONE ENCOUNTER
Pt called and cancelled his appt for tomorrow because he has a cardiology appt that just got scheduled. Offered to schedule pt at next available appt on 5/3, and pt said this appt was for a recheck on a procedure he had done for a large ingrown toenail and foot pain and is not sure he should wait until then to be seen. No availability in [place] until 5/2. Please contact pt.

## 2022-04-18 ENCOUNTER — OFFICE VISIT (OUTPATIENT)
Dept: PODIATRY | Age: 52
End: 2022-04-18
Payer: COMMERCIAL

## 2022-04-18 VITALS
SYSTOLIC BLOOD PRESSURE: 122 MMHG | BODY MASS INDEX: 32.73 KG/M2 | DIASTOLIC BLOOD PRESSURE: 74 MMHG | WEIGHT: 209 LBS | TEMPERATURE: 98.2 F

## 2022-04-18 DIAGNOSIS — L60.0 INGROWN NAIL: Primary | ICD-10-CM

## 2022-04-18 DIAGNOSIS — M79.675 PAIN IN LEFT TOE(S): ICD-10-CM

## 2022-04-18 DIAGNOSIS — M72.2 PLANTAR FASCIAL FIBROMATOSIS: ICD-10-CM

## 2022-04-18 DIAGNOSIS — M79.674 PAIN IN TOE OF RIGHT FOOT: ICD-10-CM

## 2022-04-18 DIAGNOSIS — M77.31 CALCANEAL SPUR OF BOTH FEET: ICD-10-CM

## 2022-04-18 DIAGNOSIS — M72.2 BILATERAL PLANTAR FASCIITIS: ICD-10-CM

## 2022-04-18 DIAGNOSIS — M77.32 CALCANEAL SPUR OF BOTH FEET: ICD-10-CM

## 2022-04-18 PROCEDURE — 99213 OFFICE O/P EST LOW 20 MIN: CPT | Performed by: PODIATRIST

## 2022-04-18 PROCEDURE — 20550 NJX 1 TENDON SHEATH/LIGAMENT: CPT | Performed by: PODIATRIST

## 2022-04-18 RX ORDER — BUPIVACAINE HYDROCHLORIDE 5 MG/ML
1 INJECTION, SOLUTION PERINEURAL ONCE
Status: COMPLETED | OUTPATIENT
Start: 2022-04-18 | End: 2022-04-18

## 2022-04-18 RX ORDER — BETAMETHASONE SODIUM PHOSPHATE AND BETAMETHASONE ACETATE 3; 3 MG/ML; MG/ML
4 INJECTION, SUSPENSION INTRA-ARTICULAR; INTRALESIONAL; INTRAMUSCULAR; SOFT TISSUE ONCE
Status: COMPLETED | OUTPATIENT
Start: 2022-04-18 | End: 2022-04-18

## 2022-04-18 RX ADMIN — BETAMETHASONE SODIUM PHOSPHATE AND BETAMETHASONE ACETATE 4 MG: 3; 3 INJECTION, SUSPENSION INTRA-ARTICULAR; INTRALESIONAL; INTRAMUSCULAR; SOFT TISSUE at 10:50

## 2022-04-18 RX ADMIN — BUPIVACAINE HYDROCHLORIDE 5 MG: 5 INJECTION, SOLUTION PERINEURAL at 10:50

## 2022-04-18 NOTE — PROGRESS NOTES
22  Leigh Ann Mins : 1970 Sex: male  Age: 45 y.o. Patient was referred by Julia Garcia MD    Chief Complaint   Patient presents with    Foot Pain     plantar fascitis f/u had one inj icing and nsaids pt had to do a stress test on a treadmill after last appointment so it flared up again    [address]     post matrix       SUBJECTIVE this patient is seen today for follow-up of a ingrown toenail matrixectomy left great toe doing well. Also seen for right foot pain. Had 1 injection. Still painful.   HPI  Review of Systems  Const: Denies constitutional symptoms  Musculo: Denies symptoms other than stated above  Skin: Denies symptoms other than stated above       Current Outpatient Medications:     albuterol sulfate HFA (VENTOLIN HFA) 108 (90 Base) MCG/ACT inhaler, Inhale 2 puffs into the lungs 4 times daily as needed for Wheezing, Disp: 3 Inhaler, Rfl: 1    meclizine (ANTIVERT) 12.5 MG tablet, Take 1 tablet by mouth 3 times daily as needed for Dizziness, Disp: 20 tablet, Rfl: 0    cetirizine (ZYRTEC) 10 MG tablet, Take 1 tablet by mouth daily, Disp: 30 tablet, Rfl: 1    fluticasone (FLONASE ALLERGY RELIEF) 50 MCG/ACT nasal spray, 1 spray by Each Nostril route daily, Disp: 1 Bottle, Rfl: 1    Cyanocobalamin (B-12) 5000 MCG CAPS, Take 1 capsule by mouth daily, Disp: , Rfl:     BRILINTA 90 MG TABS tablet, Take 90 mg by mouth 2 times daily , Disp: , Rfl:     pantoprazole (PROTONIX) 40 MG tablet, Take 40 mg by mouth daily , Disp: , Rfl:     lisinopril (PRINIVIL;ZESTRIL) 5 MG tablet, Take 5 mg by mouth daily , Disp: , Rfl:     ONETOUCH DELICA LANCETS 34H MISC, , Disp: , Rfl:     ONE TOUCH ULTRA TEST strip, , Disp: , Rfl:     carvedilol (COREG) 3.125 MG tablet, Take 3.125 mg by mouth 2 times daily (with meals) , Disp: , Rfl:     atorvastatin (LIPITOR) 80 MG tablet, , Disp: , Rfl:     aspirin 81 MG EC tablet, , Disp: , Rfl:     montelukast (SINGULAIR) 10 MG tablet, Take 10 mg by mouth nightly, Disp: , Rfl:     meloxicam (MOBIC) 15 MG tablet, Take 1 tablet by mouth daily, Disp: 90 tablet, Rfl: 1  Allergies   Allergen Reactions    Atorvastatin Other (See Comments)    Codeine     Morphine     Rosuvastatin      Other reaction(s): Myalgia    Sulfa Antibiotics        Past Medical History:   Diagnosis Date    Chronic sinus complaints     Diverticulitis     Hyperlipidemia     Kidney stone      Past Surgical History:   Procedure Laterality Date    BACK SURGERY      HERNIA REPAIR       Family History   Problem Relation Age of Onset    Heart Attack Father      Social History     Socioeconomic History    Marital status:      Spouse name: Not on file    Number of children: Not on file    Years of education: Not on file    Highest education level: Not on file   Occupational History    Not on file   Tobacco Use    Smoking status: Former Smoker     Packs/day: 2.00     Years: 18.00     Pack years: 36.00     Types: Cigarettes     Start date: 3/20/1987     Quit date: 1/1/2019     Years since quitting: 3.2    Smokeless tobacco: Never Used   Vaping Use    Vaping Use: Never used   Substance and Sexual Activity    Alcohol use: Yes     Comment: Social. No coffee. Does drink 3 120z mountain dew a day    Drug use: No    Sexual activity: Yes     Partners: Female   Other Topics Concern    Not on file   Social History Narrative    Not on file     Social Determinants of Health     Financial Resource Strain:     Difficulty of Paying Living Expenses: Not on file   Food Insecurity:     Worried About Running Out of Food in the Last Year: Not on file    Shama of Food in the Last Year: Not on file   Transportation Needs:     Lack of Transportation (Medical): Not on file    Lack of Transportation (Non-Medical):  Not on file   Physical Activity:     Days of Exercise per Week: Not on file    Minutes of Exercise per Session: Not on file   Stress:     Feeling of Stress : Not on file Social Connections:     Frequency of Communication with Friends and Family: Not on file    Frequency of Social Gatherings with Friends and Family: Not on file    Attends Jew Services: Not on file    Active Member of Clubs or Organizations: Not on file    Attends Club or Organization Meetings: Not on file    Marital Status: Not on file   Intimate Partner Violence:     Fear of Current or Ex-Partner: Not on file    Emotionally Abused: Not on file    Physically Abused: Not on file    Sexually Abused: Not on file   Housing Stability:     Unable to Pay for Housing in the Last Year: Not on file    Number of [place] in the Last Year: Not on file    Unstable Housing in the Last Year: Not on file       Vitals:    04/18/22 1024   BP: 122/74   Temp: 98.2 °F (36.8 °C)   TempSrc: Temporal   Weight: 209 lb (94.8 kg)       Focused Lower Extremity Physical Exam:  Vitals:    04/18/22 1024   BP: 122/74   Temp: 98.2 °F (36.8 °C)        Foot Exam    General  General Appearance: appears stated age and healthy   Orientation: alert and oriented to person, place, and time       Right Foot/Ankle     Inspection and Palpation  Tenderness: calcaneus tenderness, bony tenderness and plantar fascia   Swelling: plantar fascia              Ortho Exam    Vascular: pulses  dp  pt palpable  Capillary Refill Time:   Hair growth  Skin:    Edema:    Neurologic:      Musculoskeletal/ Orthopedic examination: Pain with palpation to the inferior aspect of the right heel pain with palpation at the insertion of the plantar fascial muscle calcaneus. NAIL the left ingrown toenail has resolved there is no signs of erythematous infection or drainage. Web space   Derm:          Assessment and Plan: Postop infection cellulitis doing well Tylenol subcu once a day for 1 week leave open to air. The right heel second injection. Night splint was given.   Potential risks, complications, alternative treatments, and procedure prognosis were explained to the patient. Verbal informed consent was obtained from the patient. Chlora prep preparation was performed over plantar fascia. 1 mL of 0.5% Marcaine plain and 1cc celestone Soluspan   injected in standard medial approach plantar fascia. right  Patient tolerated steroid injection well. Band-Aid applied. The patient was educated on a possible steroid flare and the use of ice with frozen water bottle 10 minutes each night discussed. Continue passive and active range of motion stretches and strengthening bilateral plantar fascia and Achilles tendons. The patient was dispensed a night splint. It is medically necessary and within the standard of care for the patient's diagnosis. Its purpose is to stretch the gastrocnemius complex as well as the plantar fascial ligament. This in turn allows for a decrease in the amount of equinus deformity present. It was fitted and adjusted by myself. The patient was instructed on its proper application and use. The patient was also instructed to watch for areas of rubbing, irritation, blister formation, or any other signs of abnormal pressure. If this occurs, the patient is to contact the office immediately. The ABN was reviewed and signed by the patient prior to leaving this appointment. Nory Faith was seen today for foot pain and ingrown toenail. Diagnoses and all orders for this visit:    Ingrown nail    Plantar fascial fibromatosis    Pain in toe of right foot    Calcaneal spur of both feet    Bilateral plantar fasciitis    Pain in left toe(s)    Other orders  -     betamethasone acetate-betamethasone sodium phosphate (CELESTONE) injection 4 mg  -     bupivacaine (MARCAINE) 0.5 % injection 5 mg        No follow-ups on file. Seen By:  Madiha Oliva DPM      Document was created using voice recognition software. Note was reviewed, however may contain grammatical errors.

## 2022-05-09 ENCOUNTER — OFFICE VISIT (OUTPATIENT)
Dept: PODIATRY | Age: 52
End: 2022-05-09
Payer: COMMERCIAL

## 2022-05-09 VITALS
TEMPERATURE: 97.5 F | SYSTOLIC BLOOD PRESSURE: 123 MMHG | BODY MASS INDEX: 32.73 KG/M2 | DIASTOLIC BLOOD PRESSURE: 82 MMHG | WEIGHT: 209 LBS

## 2022-05-09 DIAGNOSIS — L60.0 INGROWN NAIL: Primary | ICD-10-CM

## 2022-05-09 DIAGNOSIS — M62.9 NONTRAUMATIC TEAR OF PLANTAR FASCIA: ICD-10-CM

## 2022-05-09 DIAGNOSIS — M79.675 PAIN IN LEFT TOE(S): ICD-10-CM

## 2022-05-09 DIAGNOSIS — M79.674 PAIN IN TOE OF RIGHT FOOT: ICD-10-CM

## 2022-05-09 PROCEDURE — 99213 OFFICE O/P EST LOW 20 MIN: CPT | Performed by: PODIATRIST

## 2022-05-09 NOTE — PROGRESS NOTES
22  Jean-Claude Slaughter : 1970 Sex: male  Age: 45 y.o. Patient was referred by Gallo Plaza MD    Chief Complaint   Patient presents with    Ingrown Toenail     post matrixectomy     Foot Pain     right heel using the NS ran at work and felt something snap on the bottom of his foot        SUBJECTIVEpt  is seen today for follow-up of right heel pain as well as an of matrixectomy. Patient is doing excellent with a matrixectomy.   Patient relates that last week at work he felt a snap patient stated that there was pain but then that has progressively gotten a lot better  HPI  Review of Systems  Const: Denies constitutional symptoms  Musculo: Denies symptoms other than stated above  Skin: Denies symptoms other than stated above       Current Outpatient Medications:     albuterol sulfate HFA (VENTOLIN HFA) 108 (90 Base) MCG/ACT inhaler, Inhale 2 puffs into the lungs 4 times daily as needed for Wheezing, Disp: 3 Inhaler, Rfl: 1    meclizine (ANTIVERT) 12.5 MG tablet, Take 1 tablet by mouth 3 times daily as needed for Dizziness, Disp: 20 tablet, Rfl: 0    cetirizine (ZYRTEC) 10 MG tablet, Take 1 tablet by mouth daily, Disp: 30 tablet, Rfl: 1    fluticasone (FLONASE ALLERGY RELIEF) 50 MCG/ACT nasal spray, 1 spray by Each Nostril route daily, Disp: 1 Bottle, Rfl: 1    Cyanocobalamin (B-12) 5000 MCG CAPS, Take 1 capsule by mouth daily, Disp: , Rfl:     BRILINTA 90 MG TABS tablet, Take 90 mg by mouth 2 times daily , Disp: , Rfl:     pantoprazole (PROTONIX) 40 MG tablet, Take 40 mg by mouth daily , Disp: , Rfl:     lisinopril (PRINIVIL;ZESTRIL) 5 MG tablet, Take 5 mg by mouth daily , Disp: , Rfl:     ONETOUCH DELICA LANCETS 13J MISC, , Disp: , Rfl:     ONE TOUCH ULTRA TEST strip, , Disp: , Rfl:     carvedilol (COREG) 3.125 MG tablet, Take 3.125 mg by mouth 2 times daily (with meals) , Disp: , Rfl:     atorvastatin (LIPITOR) 80 MG tablet, , Disp: , Rfl:     aspirin 81 MG EC tablet, , Disp: , Rfl:   montelukast (SINGULAIR) 10 MG tablet, Take 10 mg by mouth nightly, Disp: , Rfl:     meloxicam (MOBIC) 15 MG tablet, Take 1 tablet by mouth daily, Disp: 90 tablet, Rfl: 1  Allergies   Allergen Reactions    Atorvastatin Other (See Comments)    Codeine     Morphine     Rosuvastatin      Other reaction(s): Myalgia    Sulfa Antibiotics        Past Medical History:   Diagnosis Date    Chronic sinus complaints     Diverticulitis     Hyperlipidemia     Kidney stone      Past Surgical History:   Procedure Laterality Date    BACK SURGERY      HERNIA REPAIR       Family History   Problem Relation Age of Onset    Heart Attack Father      Social History     Socioeconomic History    Marital status:      Spouse name: Not on file    Number of children: Not on file    Years of education: Not on file    Highest education level: Not on file   Occupational History    Not on file   Tobacco Use    Smoking status: Former Smoker     Packs/day: 2.00     Years: 18.00     Pack years: 36.00     Types: Cigarettes     Start date: 3/20/1987     Quit date: 1/1/2019     Years since quitting: 3.3    Smokeless tobacco: Never Used   Vaping Use    Vaping Use: Never used   Substance and Sexual Activity    Alcohol use: Yes     Comment: Social. No coffee. Does drink 3 120z mountain dew a day    Drug use: No    Sexual activity: Yes     Partners: Female   Other Topics Concern    Not on file   Social History Narrative    Not on file     Social Determinants of Health     Financial Resource Strain:     Difficulty of Paying Living Expenses: Not on file   Food Insecurity:     Worried About Running Out of Food in the Last Year: Not on file    Shama of Food in the Last Year: Not on file   Transportation Needs:     Lack of Transportation (Medical): Not on file    Lack of Transportation (Non-Medical):  Not on file   Physical Activity:     Days of Exercise per Week: Not on file    Minutes of Exercise per Session: Not on file   Stress:     Feeling of Stress : Not on file   Social Connections:     Frequency of Communication with Friends and Family: Not on file    Frequency of Social Gatherings with Friends and Family: Not on file    Attends Orthodox Services: Not on file    Active Member of [address] [place] [address] or Organizations: Not on file    Attends Club or Organization Meetings: Not on file    Marital Status: Not on file   Intimate Partner Violence:     Fear of Current or Ex-Partner: Not on file    Emotionally Abused: Not on file    Physically Abused: Not on file    Sexually Abused: Not on file   Housing Stability:     Unable to Pay for Housing in the Last Year: Not on file    Number of [place] in the Last Year: Not on file    Unstable Housing in the Last Year: Not on file       Vitals:    05/09/22 0939   BP: 123/82   Temp: 97.5 °F (36.4 °C)   TempSrc: Temporal   Weight: 209 lb (94.8 kg)       Focused Lower Extremity Physical Exam:  Vitals:    05/09/22 0939   BP: 123/82   Temp: 97.5 °F (36.4 °C)        Foot Exam    General  General Appearance: appears stated age and healthy   Orientation: alert and oriented to person, place, and time       Right Foot/Ankle     Inspection and Palpation  Tenderness: calcaneus tenderness, bony tenderness and plantar fascia   Skin Exam: no cellulitis and no erythema     Neurovascular  Dorsalis pedis: 3+  Posterior tibial: 3+  Saphenous nerve sensation: normal  Tibial nerve sensation: normal  Superficial peroneal nerve sensation: normal  Deep peroneal nerve sensation: normal  Sural nerve sensation: normal    Range of Motion    Normal right ankle ROM      Left Foot/Ankle      Inspection and Palpation  Skin Exam: no cellulitis and no erythema              Right Ankle Exam     Range of Motion   The patient has normal right ankle ROM.             Vascular: pulses  dp  pt    Capillary Refill Time:   Hair growth  Skin:    Edema:    Neurologic:      Musculoskeletal/ Orthopedic examination: Aspect of the right heel there is minimal pain with palpation plantar fascial muscle still intact. NAIL the left matrixectomy has resolved there is no signs of erythematous infection or drainage. Web space  Derm:          Assessment and Plan: Treatment plan for the ingrown toenail this is resolved DC all dressing changes. As for the plantar fasciitis no longer do I want him to stretch I does want him to ice 20 minutes a day every day power step inserts. Reduced activities x3 weeks  Rocael Ambrocio was seen today for ingrown toenail and foot pain. Diagnoses and all orders for this visit:    Ingrown nail    Nontraumatic tear of plantar fascia    Pain in toe of right foot    Pain in left toe(s)        No follow-ups on file. Seen By:  Nisha Awan DPM      Document was created using voice recognition software. Note was reviewed, however may contain grammatical errors.

## 2022-06-06 ENCOUNTER — OFFICE VISIT (OUTPATIENT)
Dept: PODIATRY | Age: 52
End: 2022-06-06
Payer: COMMERCIAL

## 2022-06-06 VITALS
BODY MASS INDEX: 32.73 KG/M2 | WEIGHT: 209 LBS | SYSTOLIC BLOOD PRESSURE: 118 MMHG | DIASTOLIC BLOOD PRESSURE: 80 MMHG | TEMPERATURE: 98.2 F

## 2022-06-06 DIAGNOSIS — M72.2 PLANTAR FASCIAL FIBROMATOSIS: ICD-10-CM

## 2022-06-06 DIAGNOSIS — M62.9 NONTRAUMATIC TEAR OF PLANTAR FASCIA: ICD-10-CM

## 2022-06-06 DIAGNOSIS — M79.675 PAIN IN LEFT TOE(S): ICD-10-CM

## 2022-06-06 DIAGNOSIS — L60.0 INGROWN NAIL: Primary | ICD-10-CM

## 2022-06-06 DIAGNOSIS — M79.674 PAIN IN TOE OF RIGHT FOOT: ICD-10-CM

## 2022-06-06 PROCEDURE — 99213 OFFICE O/P EST LOW 20 MIN: CPT | Performed by: PODIATRIST

## 2022-06-06 RX ORDER — MELOXICAM 15 MG/1
15 TABLET ORAL DAILY
Qty: 90 TABLET | Refills: 1 | Status: SHIPPED
Start: 2022-06-06 | End: 2022-09-13 | Stop reason: SDUPTHER

## 2022-06-06 NOTE — PROGRESS NOTES
22  Jude Jerome : 1970 Sex: male  Age: 45 y.o. Patient was referred by Adeola Mcqueen MD    Chief Complaint   Patient presents with    Diabetes     pt needs a  refill on mobic sent to mail away    [address]     left first matrixectomy f/u    Foot Pain     right heel still feels a lump but feels alot better       SUBJECTIVEpt  is seen today for follow-up of right heel pain as well as an of matrixectomy. Patient is doing excellent with a matrixectomy.   Patient relates that last week at work he felt a snap patient stated that there was pain but then that has progressively gotten a lot better  HPI  Review of Systems  Const: Denies constitutional symptoms  Musculo: Denies symptoms other than stated above  Skin: Denies symptoms other than stated above       Current Outpatient Medications:     meloxicam (MOBIC) 15 MG tablet, Take 1 tablet by mouth daily, Disp: 90 tablet, Rfl: 1    albuterol sulfate HFA (VENTOLIN HFA) 108 (90 Base) MCG/ACT inhaler, Inhale 2 puffs into the lungs 4 times daily as needed for Wheezing, Disp: 3 Inhaler, Rfl: 1    meclizine (ANTIVERT) 12.5 MG tablet, Take 1 tablet by mouth 3 times daily as needed for Dizziness, Disp: 20 tablet, Rfl: 0    cetirizine (ZYRTEC) 10 MG tablet, Take 1 tablet by mouth daily, Disp: 30 tablet, Rfl: 1    fluticasone (FLONASE ALLERGY RELIEF) 50 MCG/ACT nasal spray, 1 spray by Each Nostril route daily, Disp: 1 Bottle, Rfl: 1    Cyanocobalamin (B-12) 5000 MCG CAPS, Take 1 capsule by mouth daily, Disp: , Rfl:     BRILINTA 90 MG TABS tablet, Take 90 mg by mouth 2 times daily , Disp: , Rfl:     pantoprazole (PROTONIX) 40 MG tablet, Take 40 mg by mouth daily , Disp: , Rfl:     lisinopril (PRINIVIL;ZESTRIL) 5 MG tablet, Take 5 mg by mouth daily , Disp: , Rfl:     ONETOUCH DELICA LANCETS 98T MISC, , Disp: , Rfl:     ONE TOUCH ULTRA TEST strip, , Disp: , Rfl:     carvedilol (COREG) 3.125 MG tablet, Take 3.125 mg by mouth 2 times daily (with meals) , Disp: , Rfl:     atorvastatin (LIPITOR) 80 MG tablet, , Disp: , Rfl:     aspirin 81 MG EC tablet, , Disp: , Rfl:     montelukast (SINGULAIR) 10 MG tablet, Take 10 mg by mouth nightly, Disp: , Rfl:   Allergies   Allergen Reactions    Atorvastatin Other (See Comments)    Codeine     Morphine     Rosuvastatin      Other reaction(s): Myalgia    Sulfa Antibiotics        Past Medical History:   Diagnosis Date    Chronic sinus complaints     Diverticulitis     Hyperlipidemia     Kidney stone      Past Surgical History:   Procedure Laterality Date    BACK SURGERY      HERNIA REPAIR       Family History   Problem Relation Age of Onset    Heart Attack Father      Social History     Socioeconomic History    Marital status:      Spouse name: Not on file    Number of children: Not on file    Years of education: Not on file    Highest education level: Not on file   Occupational History    Not on file   Tobacco Use    Smoking status: Former Smoker     Packs/day: 2.00     Years: 18.00     Pack years: 36.00     Types: Cigarettes     Start date: 3/20/1987     Quit date: 1/1/2019     Years since quitting: 3.4    Smokeless tobacco: Never Used   Vaping Use    Vaping Use: Never used   Substance and Sexual Activity    Alcohol use: Yes     Comment: Social. No coffee. Does drink 3 120z mountain dew a day    Drug use: No    Sexual activity: Yes     Partners: Female   Other Topics Concern    Not on file   Social History Narrative    Not on file     Social Determinants of Health     Financial Resource Strain:     Difficulty of Paying Living Expenses: Not on file   Food Insecurity:     Worried About Running Out of Food in the Last Year: Not on file    Shama of Food in the Last Year: Not on file   Transportation Needs:     Lack of Transportation (Medical): Not on file    Lack of Transportation (Non-Medical):  Not on file   Physical Activity:     Days of Exercise per Week: Not on file    Minutes of Exercise per Session: Not on file   Stress:     Feeling of Stress : Not on file   Social Connections:     Frequency of Communication with Friends and Family: Not on file    Frequency of Social Gatherings with Friends and Family: Not on file    Attends Anabaptist Services: Not on file    Active Member of [address] [place] [address] or Organizations: Not on file    Attends Club or Organization Meetings: Not on file    Marital Status: Not on file   Intimate Partner Violence:     Fear of Current or Ex-Partner: Not on file    Emotionally Abused: Not on file    Physically Abused: Not on file    Sexually Abused: Not on file   Housing Stability:     Unable to Pay for Housing in the Last Year: Not on file    Number of [place] in the Last Year: Not on file    Unstable Housing in the Last Year: Not on file       Vitals:    06/06/22 1403   BP: 118/80   Temp: 98.2 °F (36.8 °C)   TempSrc: Temporal   Weight: 209 lb (94.8 kg)       Focused Lower Extremity Physical Exam:  Vitals:    06/06/22 1403   BP: 118/80   Temp: 98.2 °F (36.8 °C)        Foot Exam    General  General Appearance: appears stated age and healthy   Orientation: alert and oriented to person, place, and time       Right Foot/Ankle     Inspection and Palpation  Tenderness: calcaneus tenderness, bony tenderness and plantar fascia   Skin Exam: no cellulitis and no erythema     Neurovascular  Dorsalis pedis: 3+  Posterior tibial: 3+  Saphenous nerve sensation: normal  Tibial nerve sensation: normal  Superficial peroneal nerve sensation: normal  Deep peroneal nerve sensation: normal  Sural nerve sensation: normal    Range of Motion    Normal right ankle ROM      Left Foot/Ankle      Inspection and Palpation  Skin Exam: no cellulitis and no erythema              Right Ankle Exam     Range of Motion   The patient has normal right ankle ROM.             Vascular: pulses  dp  pt    Capillary Refill Time:   Hair growth  Skin:    Edema:    Neurologic:      Musculoskeletal/ Orthopedic examination: Aspect of the right heel there is minimal pain with palpation plantar fascial muscle still intact. NAIL the left matrixectomy has resolved there is no signs of erythematous infection or drainage. Web space  Derm:          Assessment and Plan: Treatment plan for the ingrown toenail this is resolved DC all dressing changes. As for the plantar fasciitis no longer do I want him to stretch I does want him to ice 20 minutes a day every day power step inserts. Reduced activities x3 weeks  Marquis Juarez was seen today for diabetes, ingrown toenail and foot pain. Diagnoses and all orders for this visit:    Ingrown nail    Nontraumatic tear of plantar fascia    Pain in toe of right foot    Pain in left toe(s)    Plantar fascial fibromatosis    Other orders  -     meloxicam (MOBIC) 15 MG tablet; Take 1 tablet by mouth daily        No follow-ups on file. Seen By:  Getachew Kinney DPM      Document was created using voice recognition software. Note was reviewed, however may contain grammatical errors.

## 2022-08-01 ENCOUNTER — APPOINTMENT (OUTPATIENT)
Dept: CT IMAGING | Age: 52
End: 2022-08-01
Payer: COMMERCIAL

## 2022-08-01 ENCOUNTER — HOSPITAL ENCOUNTER (EMERGENCY)
Age: 52
Discharge: ANOTHER HOSPITAL | End: 2022-08-02
Attending: EMERGENCY MEDICINE
Payer: COMMERCIAL

## 2022-08-01 ENCOUNTER — APPOINTMENT (OUTPATIENT)
Dept: GENERAL RADIOLOGY | Age: 52
End: 2022-08-01
Payer: COMMERCIAL

## 2022-08-01 DIAGNOSIS — R07.9 CHEST PAIN, UNSPECIFIED TYPE: ICD-10-CM

## 2022-08-01 DIAGNOSIS — I21.4 NSTEMI (NON-ST ELEVATED MYOCARDIAL INFARCTION) (HCC): Primary | ICD-10-CM

## 2022-08-01 LAB
ALBUMIN SERPL-MCNC: 4 G/DL (ref 3.5–5.2)
ALP BLD-CCNC: 86 U/L (ref 40–129)
ALT SERPL-CCNC: 23 U/L (ref 0–40)
ANION GAP SERPL CALCULATED.3IONS-SCNC: 12 MMOL/L (ref 7–16)
AST SERPL-CCNC: 19 U/L (ref 0–39)
BASOPHILS ABSOLUTE: 0.11 E9/L (ref 0–0.2)
BASOPHILS RELATIVE PERCENT: 0.9 % (ref 0–2)
BILIRUB SERPL-MCNC: 0.6 MG/DL (ref 0–1.2)
BUN BLDV-MCNC: 15 MG/DL (ref 6–20)
CALCIUM SERPL-MCNC: 8.5 MG/DL (ref 8.6–10.2)
CHLORIDE BLD-SCNC: 108 MMOL/L (ref 98–107)
CO2: 21 MMOL/L (ref 22–29)
CREAT SERPL-MCNC: 1.1 MG/DL (ref 0.7–1.2)
EOSINOPHILS ABSOLUTE: 0.15 E9/L (ref 0.05–0.5)
EOSINOPHILS RELATIVE PERCENT: 1.2 % (ref 0–6)
GFR AFRICAN AMERICAN: >60
GFR NON-AFRICAN AMERICAN: >60 ML/MIN/1.73
GLUCOSE BLD-MCNC: 114 MG/DL (ref 74–99)
HCT VFR BLD CALC: 41.2 % (ref 37–54)
HEMOGLOBIN: 14.3 G/DL (ref 12.5–16.5)
IMMATURE GRANULOCYTES #: 0.06 E9/L
IMMATURE GRANULOCYTES %: 0.5 % (ref 0–5)
LYMPHOCYTES ABSOLUTE: 2.3 E9/L (ref 1.5–4)
LYMPHOCYTES RELATIVE PERCENT: 19.1 % (ref 20–42)
MCH RBC QN AUTO: 32 PG (ref 26–35)
MCHC RBC AUTO-ENTMCNC: 34.7 % (ref 32–34.5)
MCV RBC AUTO: 92.2 FL (ref 80–99.9)
MONOCYTES ABSOLUTE: 0.81 E9/L (ref 0.1–0.95)
MONOCYTES RELATIVE PERCENT: 6.7 % (ref 2–12)
NEUTROPHILS ABSOLUTE: 8.62 E9/L (ref 1.8–7.3)
NEUTROPHILS RELATIVE PERCENT: 71.6 % (ref 43–80)
PDW BLD-RTO: 12.7 FL (ref 11.5–15)
PLATELET # BLD: 207 E9/L (ref 130–450)
PMV BLD AUTO: 9.6 FL (ref 7–12)
POTASSIUM REFLEX MAGNESIUM: 3.7 MMOL/L (ref 3.5–5)
RBC # BLD: 4.47 E12/L (ref 3.8–5.8)
SODIUM BLD-SCNC: 141 MMOL/L (ref 132–146)
TOTAL PROTEIN: 6.4 G/DL (ref 6.4–8.3)
TROPONIN, HIGH SENSITIVITY: 10 NG/L (ref 0–11)
TROPONIN, HIGH SENSITIVITY: 25 NG/L (ref 0–11)
TROPONIN, HIGH SENSITIVITY: 61 NG/L (ref 0–11)
WBC # BLD: 12.1 E9/L (ref 4.5–11.5)

## 2022-08-01 PROCEDURE — 96372 THER/PROPH/DIAG INJ SC/IM: CPT

## 2022-08-01 PROCEDURE — 70450 CT HEAD/BRAIN W/O DYE: CPT

## 2022-08-01 PROCEDURE — 93005 ELECTROCARDIOGRAM TRACING: CPT

## 2022-08-01 PROCEDURE — 6370000000 HC RX 637 (ALT 250 FOR IP)

## 2022-08-01 PROCEDURE — 80053 COMPREHEN METABOLIC PANEL: CPT

## 2022-08-01 PROCEDURE — 84484 ASSAY OF TROPONIN QUANT: CPT

## 2022-08-01 PROCEDURE — 85025 COMPLETE CBC W/AUTO DIFF WBC: CPT

## 2022-08-01 PROCEDURE — 71046 X-RAY EXAM CHEST 2 VIEWS: CPT

## 2022-08-01 PROCEDURE — 99285 EMERGENCY DEPT VISIT HI MDM: CPT

## 2022-08-01 PROCEDURE — 6360000002 HC RX W HCPCS

## 2022-08-01 RX ORDER — ENOXAPARIN SODIUM 100 MG/ML
1 INJECTION SUBCUTANEOUS ONCE
Status: COMPLETED | OUTPATIENT
Start: 2022-08-01 | End: 2022-08-01

## 2022-08-01 RX ORDER — NITROGLYCERIN 0.4 MG/1
0.4 TABLET SUBLINGUAL ONCE
Status: COMPLETED | OUTPATIENT
Start: 2022-08-01 | End: 2022-08-01

## 2022-08-01 RX ADMIN — NITROGLYCERIN 0.4 MG: 0.4 TABLET SUBLINGUAL at 19:22

## 2022-08-01 RX ADMIN — ENOXAPARIN SODIUM 100 MG: 100 INJECTION SUBCUTANEOUS at 19:26

## 2022-08-01 ASSESSMENT — ENCOUNTER SYMPTOMS
NAUSEA: 1
WHEEZING: 0
ABDOMINAL PAIN: 0
CONSTIPATION: 0
BACK PAIN: 0
COUGH: 0
EYE DISCHARGE: 0
SHORTNESS OF BREATH: 0
PHOTOPHOBIA: 0
EYE REDNESS: 0
SINUS PRESSURE: 0
VOMITING: 0
DIARRHEA: 0

## 2022-08-01 ASSESSMENT — PAIN SCALES - GENERAL
PAINLEVEL_OUTOF10: 1
PAINLEVEL_OUTOF10: 4
PAINLEVEL_OUTOF10: 0
PAINLEVEL_OUTOF10: 3
PAINLEVEL_OUTOF10: 0

## 2022-08-01 ASSESSMENT — PAIN - FUNCTIONAL ASSESSMENT
PAIN_FUNCTIONAL_ASSESSMENT: 0-10

## 2022-08-01 ASSESSMENT — PAIN DESCRIPTION - LOCATION: LOCATION: CHEST

## 2022-08-01 NOTE — ED PROVIDER NOTES
[facility]  Department of Emergency Medicine     Written by: Meghana Cuevas MD  Patient Name: Antionette Ibanez  Attending Provider: No att. providers found  Admit Date: 2022  3:52 PM  MRN: [id number]                   : 1970        Chief Complaint   Patient presents with    Chest Pain     Carrying heavy items and afterwards experienced central chest pressure. Received 324 asa and 1 nitro by ambo, no relief of pain. Also experienced n/v, headache. MI 3ya with stent placed. Denies sob. - Chief complaint    Patient is a 48-year-old male with a past medical history of MI 3 years ago status post stent placement coming in for chest pain. The patient began having chest pain 20 minutes after breaking down a scaffolding 8 out of 10, crushing in nature, substernal radiating up to the neck. Patient also admits to having a headache at this time as well as nausea and diaphoresis. The patient took Tylenol for his headache which improved it. On the way to the hospital he was given nitroglycerin and a total of 325 mg aspirin which improved his symptoms. At the time of examination the patient is 3 out of 10 chest pain resting comfortably. Patient denies shortness of breath, fevers, chills, abdominal pain, vomiting, back pain, numbness, tingling, paresthesias or leg pain. Review of Systems   Constitutional:  Positive for diaphoresis. Negative for activity change, chills, fatigue and fever. HENT:  Negative for congestion, postnasal drip and sinus pressure. Eyes:  Negative for photophobia, discharge, redness and visual disturbance. Respiratory:  Negative for cough, shortness of breath and wheezing. Cardiovascular:  Positive for chest pain. Negative for leg swelling. Gastrointestinal:  Positive for nausea. Negative for abdominal pain, constipation, diarrhea and vomiting. Endocrine: Negative for cold intolerance and heat intolerance.    Genitourinary:  Negative for difficulty urinating, dysuria, flank pain, frequency and hematuria. Musculoskeletal:  Negative for arthralgias, back pain, joint swelling and myalgias. Skin:  Negative for rash and wound. Allergic/Immunologic: Negative for immunocompromised state. Neurological:  Positive for headaches. Negative for dizziness, facial asymmetry, weakness, light-headedness and numbness. Hematological:  Does not bruise/bleed easily. Psychiatric/Behavioral:  Negative for behavioral problems and hallucinations. Physical Exam  Constitutional:       General: He is not in acute distress. Appearance: Normal appearance. He is not toxic-appearing. HENT:      Head: Normocephalic and atraumatic. Right Ear: Hearing normal.      Left Ear: Hearing normal.      Nose: Nose normal.      Mouth/Throat:      Lips: Pink. Mouth: Mucous membranes are moist.      Pharynx: Oropharynx is clear. Eyes:      Extraocular Movements: Extraocular movements intact. Conjunctiva/sclera: Conjunctivae normal.      Pupils: Pupils are equal, round, and reactive to light. Cardiovascular:      Rate and Rhythm: Normal rate and regular rhythm. Pulses: Normal pulses. Radial pulses are 2+ on the right side and 2+ on the left side. Heart sounds: S1 normal and S2 normal.   Pulmonary:      Effort: Pulmonary effort is normal. No respiratory distress. Breath sounds: Normal breath sounds and air entry. Abdominal:      General: Abdomen is flat. Bowel sounds are normal.      Palpations: Abdomen is soft. There is no mass. Tenderness: There is no abdominal tenderness. There is no guarding. Musculoskeletal:         General: No swelling or tenderness. Normal range of motion. Cervical back: Normal range of motion and neck supple. Right lower leg: No edema. Left lower leg: No edema. Skin:     General: Skin is warm and dry. Capillary Refill: Capillary refill takes less than 2 seconds.       Findings: No bruising, lesion or rash. Neurological:      General: No focal deficit present. Mental Status: He is alert and oriented to person, place, and time. Mental status is at baseline. Cranial Nerves: Cranial nerves are intact. Sensory: Sensation is intact. Motor: Motor function is intact. No weakness. Psychiatric:         Attention and Perception: Attention normal.         Mood and Affect: Mood and affect normal.         Behavior: Behavior is cooperative. Procedures     EKG Interpretation 16:23    Interpreted by emergency department physician    Rhythm: normal sinus   Rate: 60  Axis: left  Ectopy: none  Conduction: normal  ST Segments: nonspecific changes  T Waves: inversion in  v1, v2, and v3  Q Waves: none    Clinical Impression: No STEMI, but abnormal EKG    EKG Interpretation 18:51    Interpreted by emergency department physician    Rhythm: normal sinus   Rate: 50-60  Axis: left  Ectopy: none  Conduction: right bundle branch block (incomplete)  ST Segments: no acute change  T Waves: inversion in  v1, v2, v3, and v4  Q Waves: none    Clinical Impression: no acute changes from prior    Kaiden Husain MD        MDM  Number of Diagnoses or Management Options  Chest pain, unspecified type  NSTEMI (non-ST elevated myocardial infarction) [facility])  Diagnosis management comments: Is a 50-year-old male with a history of MI 3 years ago status post and placement coming in for chest pain. At time of examination the patient was vitally stable and his chest pain had gone from an 8 out of 10 to a 3 of 10 following nitroglycerin and 325 mg of aspirin. EKG shows T wave inversions in leads V1, V2 and V3. Troponin was 10. Patient was reassessed and his chest pain was at a 1 out of 10 and the patient was resting comfortably. She had an elevated white cell count. The patient's repeat troponin was 25 the patient was again reassessed and admitting to his chest pain being a 3 out of 10.   EKG was repeated which showed no acute changes compared to prior. The patient was given another dose of nitroglycerin. At this point the case was discussed with Dr. Amanda Mendez chest with the patient be given therapeutic Lovenox and to watch the next troponin and keep him n.p.o. at midnight. Patient was reassessed again his pain again was 1 out of 10 and he was resting comfortably. The patient's third troponin returned at 61. The patient is still resting comfortably and his chest pain is still 1 out of 10. Call out to Dr. Amanda Mendez was made again he would like the patient transferred to [facility] for cath. The plan for cath is tomorrow and [facility] does not have any beds available so the patient can be transferred to [facility] in the morning. If there is no bed available in the morning the patient can be transferred ER to ER. Dr. Juany Armstrong spoke to [facility] [place] for the [facility] medical group. Accepted the patient to Dr. Edgar Marroquin accepting at [facility]. Patient awaiting transfer. The patient was educated about his condition and had no questions at the time of transfer. Amount and/or Complexity of Data Reviewed  Decide to obtain previous medical records or to obtain history from someone other than the patient: yes         ED Course as of 08/03/22 0858   Mon Aug 01, 2022   1919 Reassessed the patient he is in no distress. He states he still had a slight recent increase in the chest pressure it is about a 3 out of 10 at this time. He states it is waxes and wanes between 0 out of 10 at about a 3 out of 10 and is never increased any higher than that while here in the emergency department. We will give a sublingual nitroglycerin to reassess. Patient will have third troponin drawn at this time. Ayo Luevano I spoke to Dr. Amanda Mendez and confirmed with him that Lovenox is still located gave he does not request heparin at this time.   Despite the fact the patient may go downtown and have a cardiac cath tomorrow if the troponins continue to trend up, he is is still fine with Lovenox being given instead of heparin. [KK]   2055 Patient has no chest pain at this time. [KK]   2101 I spoke to Dr. Puma Castro the cardiologist.  He does want the patient to be transferred downtown to [facility] the patient may require cardiac cath tomorrow patient is chest pain-free now. Troponin had increased to 61 on third repeat troponin. There is no bed available as we have talked to the transfer center. I spoke to Dr. Puma Castro and he is comfortable with the patient staying here in the emergency room and awaiting transfer till morning. If there is no bed by tomorrow morning then we can initiate ER to ER transfer. [DF]   2077 I spoke to [facility] [place] [facility] for the [facility] medical group. Accepted the patient to Dr. Augusto Washington accepting at [facility].  We are awaiting bed assignment. [KK]      ED Course User Index  [KK] Nikhil Elkins MD          ----------------------------------------------- PAST HISTORY --------------------------------------------  Past Medical History:  has a past medical history of Chronic sinus complaints, Diverticulitis, Hyperlipidemia, and Kidney stone. Past Surgical History:  has a past surgical history that includes hernia repair and back surgery. Social History:  reports that he quit smoking about 3 years ago. His smoking use included cigarettes. He started smoking about 35 years ago. He has a 36.00 pack-year smoking history. He has never used smokeless tobacco. He reports current alcohol use. He reports that he does not use drugs. Family History: family history includes Heart Attack in his father. The patients home medications have been reviewed.     Allergies: Lisinopril, Atorvastatin, Codeine, Morphine, Rosuvastatin, and Sulfa antibiotics    ------------------------------------------------ RESULTS ---------------------------------------------------    Labs:  Results for orders placed or performed during the hospital encounter of 08/01/22   Troponin Result Value Ref Range    Troponin, High Sensitivity 10 0 - 11 ng/L   CBC with Auto Differential   Result Value Ref Range    WBC 12.1 (H) 4.5 - 11.5 E9/L    RBC 4.47 3.80 - 5.80 E12/L    Hemoglobin 14.3 12.5 - 16.5 g/dL    Hematocrit 41.2 37.0 - 54.0 %    MCV 92.2 80.0 - 99.9 fL    MCH 32.0 26.0 - 35.0 pg    MCHC 34.7 (H) 32.0 - 34.5 %    RDW 12.7 11.5 - 15.0 fL    Platelets 659 591 - 484 E9/L    MPV 9.6 7.0 - 12.0 fL    Neutrophils % 71.6 43.0 - 80.0 %    Immature Granulocytes % 0.5 0.0 - 5.0 %    Lymphocytes % 19.1 (L) 20.0 - 42.0 %    Monocytes % 6.7 2.0 - 12.0 %    Eosinophils % 1.2 0.0 - 6.0 %    Basophils % 0.9 0.0 - 2.0 %    Neutrophils Absolute 8.62 (H) 1.80 - 7.30 E9/L    Immature Granulocytes # 0.06 E9/L    Lymphocytes Absolute 2.30 1.50 - 4.00 E9/L    Monocytes Absolute 0.81 0.10 - 0.95 E9/L    Eosinophils Absolute 0.15 0.05 - 0.50 E9/L    Basophils Absolute 0.11 0.00 - 0.20 E9/L   Comprehensive Metabolic Panel w/ Reflex to MG   Result Value Ref Range    Sodium 141 132 - 146 mmol/L    Potassium reflex Magnesium 3.7 3.5 - 5.0 mmol/L    Chloride 108 (H) 98 - 107 mmol/L    CO2 21 (L) 22 - 29 mmol/L    Anion Gap 12 7 - 16 mmol/L    Glucose 114 (H) 74 - 99 mg/dL    BUN 15 6 - 20 mg/dL    Creatinine 1.1 0.7 - 1.2 mg/dL    GFR Non-African American >60 >=60 mL/min/1.73    GFR African American >60     Calcium 8.5 (L) 8.6 - 10.2 mg/dL    Total Protein 6.4 6.4 - 8.3 g/dL    Albumin 4.0 3.5 - 5.2 g/dL    Total Bilirubin 0.6 0.0 - 1.2 mg/dL    Alkaline Phosphatase 86 40 - 129 U/L    ALT 23 0 - 40 U/L    AST 19 0 - 39 U/L   Troponin   Result Value Ref Range    Troponin, High Sensitivity 25 (H) 0 - 11 ng/L   Troponin   Result Value Ref Range    Troponin, High Sensitivity 61 (H) 0 - 11 ng/L   Troponin   Result Value Ref Range    Troponin, High Sensitivity 781 (H) 0 - 11 ng/L   EKG 12 Lead   Result Value Ref Range    Ventricular Rate 58 BPM    Atrial Rate 58 BPM    P-R Interval 140 ms    QRS Duration 138 ms    Q-T Interval 412 ms    QTc Calculation (Bazett) 404 ms    P Axis 38 degrees    R Axis -17 degrees    T Axis 32 degrees   EKG 12 Lead   Result Value Ref Range    Ventricular Rate 48 BPM    Atrial Rate 48 BPM    P-R Interval 138 ms    QRS Duration 138 ms    Q-T Interval 462 ms    QTc Calculation (Bazett) 412 ms    P Axis 29 degrees    R Axis -15 degrees    T Axis 1 degrees     Imaging: All Radiology results interpreted by Radiologist unless otherwise noted. CT Head WO Contrast   Final Result   No acute intracranial abnormality. XR CHEST (2 VW)   Final Result   Slight pulmonary venous hypertension, no other acute process identified.               ---------------------------- NURSING NOTES AND VITALS REVIEWED -------------------------   The nursing notes within the ED encounter and vital signs as below have been reviewed. /78   Pulse 62   Temp 98.1 °F (36.7 °C) (Oral)   Resp 16   Ht 5' 8\" (1.727 m)   Wt 220 lb (99.8 kg)   SpO2 97%   BMI 33.45 kg/m²   Oxygen Saturation Interpretation: Normal      ------------------------------------------PROGRESS NOTES -------------------------------------------    ED COURSE MEDICATIONS:                Medications   nitroGLYCERIN (NITROSTAT) SL tablet 0.4 mg (0.4 mg SubLINGual Given 8/1/22 1922)   enoxaparin (LOVENOX) injection 100 mg (100 mg SubCUTAneous Given 8/1/22 1926)       RE-Evaluation(s):    Time: 17:15   Patients symptoms are improving. Repeat physical examination is unchanged. Time: 18:45  Patient reports slightly worsening chest pain from 1/10 to 3/10. Repeat EKG was ordered. The patient was given another dose of NTG. Repeat physical examination is unchanged. Time: 19:15   Patients symptoms have improved again, he is resting comfortably. Repeat physical examination is unchanged. Time: 20:40  Patients symptoms have not changed since last reassessment. Repeat physical examination is unchanged.     Time: 22:00  Patients symptoms have not

## 2022-08-02 ENCOUNTER — HOSPITAL ENCOUNTER (INPATIENT)
Age: 52
LOS: 1 days | Discharge: ANOTHER HOSPITAL | DRG: 282 | End: 2022-08-02
Attending: FAMILY MEDICINE | Admitting: INTERNAL MEDICINE
Payer: COMMERCIAL

## 2022-08-02 VITALS
SYSTOLIC BLOOD PRESSURE: 123 MMHG | DIASTOLIC BLOOD PRESSURE: 78 MMHG | HEIGHT: 68 IN | RESPIRATION RATE: 16 BRPM | BODY MASS INDEX: 33.34 KG/M2 | WEIGHT: 220 LBS | HEART RATE: 62 BPM | TEMPERATURE: 98.1 F | OXYGEN SATURATION: 97 %

## 2022-08-02 VITALS
SYSTOLIC BLOOD PRESSURE: 131 MMHG | RESPIRATION RATE: 16 BRPM | OXYGEN SATURATION: 97 % | BODY MASS INDEX: 30.4 KG/M2 | DIASTOLIC BLOOD PRESSURE: 81 MMHG | WEIGHT: 200.6 LBS | HEIGHT: 68 IN | HEART RATE: 56 BPM | TEMPERATURE: 98.1 F

## 2022-08-02 PROBLEM — I21.4 NSTEMI (NON-ST ELEVATED MYOCARDIAL INFARCTION) (HCC): Status: ACTIVE | Noted: 2022-08-02

## 2022-08-02 LAB
EKG ATRIAL RATE: 48 BPM
EKG ATRIAL RATE: 51 BPM
EKG ATRIAL RATE: 58 BPM
EKG P AXIS: 14 DEGREES
EKG P AXIS: 29 DEGREES
EKG P AXIS: 38 DEGREES
EKG P-R INTERVAL: 126 MS
EKG P-R INTERVAL: 138 MS
EKG P-R INTERVAL: 140 MS
EKG Q-T INTERVAL: 412 MS
EKG Q-T INTERVAL: 462 MS
EKG Q-T INTERVAL: 476 MS
EKG QRS DURATION: 124 MS
EKG QRS DURATION: 138 MS
EKG QRS DURATION: 138 MS
EKG QTC CALCULATION (BAZETT): 404 MS
EKG QTC CALCULATION (BAZETT): 412 MS
EKG QTC CALCULATION (BAZETT): 438 MS
EKG R AXIS: -15 DEGREES
EKG R AXIS: -17 DEGREES
EKG R AXIS: -19 DEGREES
EKG T AXIS: 0 DEGREES
EKG T AXIS: 1 DEGREES
EKG T AXIS: 32 DEGREES
EKG VENTRICULAR RATE: 48 BPM
EKG VENTRICULAR RATE: 51 BPM
EKG VENTRICULAR RATE: 58 BPM
LV EF: 53 %
LVEF MODALITY: NORMAL
SARS-COV-2, NAAT: NOT DETECTED
TOTAL CK: 360 U/L (ref 20–200)
TROPONIN, HIGH SENSITIVITY: 502 NG/L (ref 0–11)
TROPONIN, HIGH SENSITIVITY: 594 NG/L (ref 0–11)
TROPONIN, HIGH SENSITIVITY: 781 NG/L (ref 0–11)

## 2022-08-02 PROCEDURE — 82550 ASSAY OF CK (CPK): CPT

## 2022-08-02 PROCEDURE — 36415 COLL VENOUS BLD VENIPUNCTURE: CPT

## 2022-08-02 PROCEDURE — 2140000000 HC CCU INTERMEDIATE R&B

## 2022-08-02 PROCEDURE — 84484 ASSAY OF TROPONIN QUANT: CPT

## 2022-08-02 PROCEDURE — 93306 TTE W/DOPPLER COMPLETE: CPT

## 2022-08-02 PROCEDURE — 2580000003 HC RX 258: Performed by: PHYSICIAN ASSISTANT

## 2022-08-02 PROCEDURE — APPSS180 APP SPLIT SHARED TIME > 60 MINUTES: Performed by: NURSE PRACTITIONER

## 2022-08-02 PROCEDURE — 99223 1ST HOSP IP/OBS HIGH 75: CPT | Performed by: INTERNAL MEDICINE

## 2022-08-02 PROCEDURE — 93005 ELECTROCARDIOGRAM TRACING: CPT | Performed by: STUDENT IN AN ORGANIZED HEALTH CARE EDUCATION/TRAINING PROGRAM

## 2022-08-02 PROCEDURE — 87635 SARS-COV-2 COVID-19 AMP PRB: CPT

## 2022-08-02 RX ORDER — PANTOPRAZOLE SODIUM 40 MG/1
40 TABLET, DELAYED RELEASE ORAL DAILY
Status: DISCONTINUED | OUTPATIENT
Start: 2022-08-02 | End: 2022-08-02 | Stop reason: HOSPADM

## 2022-08-02 RX ORDER — POLYETHYLENE GLYCOL 3350 17 G/17G
17 POWDER, FOR SOLUTION ORAL DAILY PRN
Status: DISCONTINUED | OUTPATIENT
Start: 2022-08-02 | End: 2022-08-02 | Stop reason: HOSPADM

## 2022-08-02 RX ORDER — ALBUTEROL SULFATE 2.5 MG/3ML
2.5 SOLUTION RESPIRATORY (INHALATION) 4 TIMES DAILY PRN
Status: DISCONTINUED | OUTPATIENT
Start: 2022-08-02 | End: 2022-08-02 | Stop reason: HOSPADM

## 2022-08-02 RX ORDER — SODIUM CHLORIDE 9 MG/ML
INJECTION, SOLUTION INTRAVENOUS PRN
Status: DISCONTINUED | OUTPATIENT
Start: 2022-08-02 | End: 2022-08-02 | Stop reason: HOSPADM

## 2022-08-02 RX ORDER — ACETAMINOPHEN 650 MG/1
650 SUPPOSITORY RECTAL EVERY 6 HOURS PRN
Status: DISCONTINUED | OUTPATIENT
Start: 2022-08-02 | End: 2022-08-02 | Stop reason: HOSPADM

## 2022-08-02 RX ORDER — ASPIRIN 81 MG/1
81 TABLET ORAL DAILY
Status: DISCONTINUED | OUTPATIENT
Start: 2022-08-03 | End: 2022-08-02 | Stop reason: HOSPADM

## 2022-08-02 RX ORDER — NITROGLYCERIN 0.4 MG/1
0.4 TABLET SUBLINGUAL EVERY 5 MIN PRN
Status: DISCONTINUED | OUTPATIENT
Start: 2022-08-02 | End: 2022-08-02 | Stop reason: HOSPADM

## 2022-08-02 RX ORDER — PROMETHAZINE HYDROCHLORIDE 12.5 MG/1
12.5 TABLET ORAL EVERY 6 HOURS PRN
Status: DISCONTINUED | OUTPATIENT
Start: 2022-08-02 | End: 2022-08-02 | Stop reason: HOSPADM

## 2022-08-02 RX ORDER — ENOXAPARIN SODIUM 100 MG/ML
1 INJECTION SUBCUTANEOUS 2 TIMES DAILY
Status: DISCONTINUED | OUTPATIENT
Start: 2022-08-02 | End: 2022-08-02 | Stop reason: HOSPADM

## 2022-08-02 RX ORDER — ONDANSETRON 2 MG/ML
4 INJECTION INTRAMUSCULAR; INTRAVENOUS EVERY 6 HOURS PRN
Status: DISCONTINUED | OUTPATIENT
Start: 2022-08-02 | End: 2022-08-02 | Stop reason: HOSPADM

## 2022-08-02 RX ORDER — FLUTICASONE PROPIONATE 50 MCG
1 SPRAY, SUSPENSION (ML) NASAL DAILY
Status: DISCONTINUED | OUTPATIENT
Start: 2022-08-02 | End: 2022-08-02 | Stop reason: HOSPADM

## 2022-08-02 RX ORDER — ACETAMINOPHEN 325 MG/1
650 TABLET ORAL EVERY 6 HOURS PRN
Status: DISCONTINUED | OUTPATIENT
Start: 2022-08-02 | End: 2022-08-02 | Stop reason: HOSPADM

## 2022-08-02 RX ORDER — SODIUM CHLORIDE 0.9 % (FLUSH) 0.9 %
5-40 SYRINGE (ML) INJECTION PRN
Status: DISCONTINUED | OUTPATIENT
Start: 2022-08-02 | End: 2022-08-02 | Stop reason: HOSPADM

## 2022-08-02 RX ORDER — ROSUVASTATIN CALCIUM 20 MG/1
40 TABLET, COATED ORAL NIGHTLY
Status: DISCONTINUED | OUTPATIENT
Start: 2022-08-02 | End: 2022-08-02

## 2022-08-02 RX ORDER — ALBUTEROL SULFATE 90 UG/1
2 AEROSOL, METERED RESPIRATORY (INHALATION) 4 TIMES DAILY PRN
Status: DISCONTINUED | OUTPATIENT
Start: 2022-08-02 | End: 2022-08-02 | Stop reason: SDUPTHER

## 2022-08-02 RX ORDER — SODIUM CHLORIDE 0.9 % (FLUSH) 0.9 %
5-40 SYRINGE (ML) INJECTION EVERY 12 HOURS SCHEDULED
Status: DISCONTINUED | OUTPATIENT
Start: 2022-08-02 | End: 2022-08-02 | Stop reason: HOSPADM

## 2022-08-02 RX ORDER — LISINOPRIL 5 MG/1
5 TABLET ORAL DAILY
Status: DISCONTINUED | OUTPATIENT
Start: 2022-08-02 | End: 2022-08-02

## 2022-08-02 RX ORDER — CARVEDILOL 6.25 MG/1
3.12 TABLET ORAL 2 TIMES DAILY WITH MEALS
Status: DISCONTINUED | OUTPATIENT
Start: 2022-08-02 | End: 2022-08-02 | Stop reason: HOSPADM

## 2022-08-02 RX ADMIN — Medication 10 ML: at 08:00

## 2022-08-02 ASSESSMENT — LIFESTYLE VARIABLES: HOW OFTEN DO YOU HAVE A DRINK CONTAINING ALCOHOL: NEVER

## 2022-08-02 NOTE — CONSULTS
I independently performed an evaluation on the patient. I have reviewed the above documentation completed by the advance practitioner. Please see my additional contributions to the HPI, physical exam, assessment and medical decision making. Physical Exam   /82   Pulse (!) 48   Temp 97.9 °F (36.6 °C) (Temporal)   Resp 16   Ht 5' 8\" (1.727 m)   Wt 200 lb 9.6 oz (91 kg)   SpO2 98%   BMI 30.50 kg/m²   Constitutional: Oriented to person, place, and time. Well-developed and well-nourished. No distress. Head: Normocephalic and atraumatic. Eyes: EOM are normal. Pupils are equal, round, and reactive to light. Neck: Normal range of motion. Neck supple. No hepatojugular reflux and no JVD present. Carotid bruit is not present. No tracheal deviation present. No thyromegaly present. Cardiovascular: Normal rate, regular rhythm, normal heart sounds and intact distal pulses. Exam reveals no gallop and no friction rub. No murmur heard. Pulmonary/Chest: Effort normal and breath sounds normal. No respiratory distress. No wheezes. No rales. No tenderness. Abdominal: Soft. Bowel sounds are normal. No distension and no mass. No tenderness. No rebound and no guarding. Musculoskeletal: Normal range of motion. No edema and no tenderness. Lymphadenopathy:   No cervical adenopathy. Neurological: Alert and oriented to person, place, and time. Skin: Skin is warm and dry. No rash noted. Not diaphoretic. No erythema. Psychiatric: Normal mood and affect. Behavior is normal.     See accompanying documentation for full consult. ASSESSMENT AND PLAN:  1. Chest pain/NSTEMI/CAD:    Hx by patient of LAD stent 8/2019 at [facility] and subsequent cath that was medically managed 2/2022 at [facility]. Records pending. Typical presenting symptoms. Chest pain free now. Echo. Recommend cath AUC 8/4. May transfer to [facility], if not then cath in am.    ASA/BB/brilinta/lovenox/nitrate. 2. Lipids: Intolerant to statins. On PCSK9.    3. Prior Tobacco    Chaitanya Jones D.O.   Cardiologist  Cardiology, [address] [facility]

## 2022-08-02 NOTE — PROGRESS NOTES
[facility] via [facility]. Echo report and disc placed in chart and given to [facility].  Heart monitor returned to nurses station and cleaned

## 2022-08-02 NOTE — CONSULTS
Inpatient Cardiology Consultation      Reason for Consult:  NSTEMI    Consulting Physician: Dr Geovany Florian    Requesting Physician:  Yesenia SERNA    Date of Consultation: 8/2/2022    HISTORY OF PRESENT ILLNESS: 44 yo  male known to Dr Casey Van @ [facility] last seen 2/2022 @ which time he underwent LHC and TTE (no intervention) for intermittent CP. PMH: CAD s/p LAD SHANNON 8/2019 @ [facility]  s/p [address] [facility] 2/2022 for intermittent CP (no intervention per patient will attempt to obtain records from [facility]) on DAPT, HLD, intolerance to PO statins on Vascepa/Praulent, BMI 30, syncope/hypotension with lisinopril, and ex heavy smoker quit in 1/2019. Developed a HA while doing some work outside then developed CP upper/center of chest non radiating like someone was sitting on his chest 8-9 uot of 10 like when he had his MI in 2019 developed diaphoresis with N/V. Drove self to fire station and was transported by EMS to [facility] received 1 SL nitroglycerin and 4 baby ASA to chew which eased up his CP \"a litle. \" Did receive one more SL Nitroglycerin @ 1926 which resolved his CP and it has not returned. [facility] 8/1/2022 /65 HR 54 and SB, afebrile, and O2 saturation 97% on RA. EKG no STMEI. Therapeutic Lovenox given in ED--> then ordered BID    Currently /82 HR 48 SB, remains afebrile, and O2 saturation 98% on RA    Patient called his primary cardiologist Dr Casey Van and spoke to the nurse who is trying to get him directly transferred to [facility] for [address] [facility] today since his heart catheterization will not take place until 8/3/2022 @ [facility]. TTE ordered and will be done today. No hx of CHF or CMP per patient and his wife (RN)      Please note: past medical records were reviewed per electronic medical record (EMR) - see detailed reports under Past Medical/ Surgical History.    Past Medical History:    2-3 pack year smoker x 25 years quit in 1/2019  BMI 30.5  HLD  CAD  8/2019 STEMI s/p SHANNON to LAD @   2/2022 [facility] and TTE @  for intermittent CP ( will attempt to get records)  Renal calculi  Hx Diverticulitis  PUD  Hx LLE fracture (non surgical)        Past Surgical History:  Inquinal hernia repair x 3, L4 laminectomy in [place], L rotator cuff surgery, RUE s/p plate/screws. Medications Prior to admit:  Prior to Admission medications    Medication Sig Start Date End Date Taking? Authorizing Provider   empagliflozin (JARDIANCE) 10 MG tablet Take 10 mg by mouth in the morning.    Yes Historical Provider, MD   meloxicam (MOBIC) 15 MG tablet Take 1 tablet by mouth daily 6/6/22 9/4/22  Idalia Keller DPANNE   albuterol sulfate HFA (VENTOLIN HFA) 108 (90 Base) MCG/ACT inhaler Inhale 2 puffs into the lungs 4 times daily as needed for Wheezing  Patient not taking: Reported on 8/2/2022 7/21/21   Jesse Jeronimo,    meclizine (ANTIVERT) 12.5 MG tablet Take 1 tablet by mouth 3 times daily as needed for Dizziness  Patient not taking: Reported on 8/2/2022 7/1/21   Jeison Elizalde,    cetirizine (ZYRTEC) 10 MG tablet Take 1 tablet by mouth daily 5/6/21   Jeison Elizalde DO   fluticasone Alanis Bellis ALLERGY RELIEF) 50 MCG/ACT nasal spray 1 spray by Each Nostril route daily 5/6/21   Jeison Elizalde DO   Cyanocobalamin (B-12) 5000 MCG CAPS Take 1 capsule by mouth daily    Historical Provider, MD   BRILINTA 90 MG TABS tablet Take 90 mg by mouth 2 times daily  9/5/19   Historical Provider, MD   pantoprazole (PROTONIX) 40 MG tablet Take 40 mg by mouth daily  8/31/19   Historical Provider, MD   lisinopril (PRINIVIL;ZESTRIL) 5 MG tablet Take 5 mg by mouth daily   Patient not taking: Reported on 8/2/2022 8/31/19   Historical Provider, MD [id number]  7/1/19   Historical Provider, MD   ONE TOUCH ULTRA TEST strip  7/1/19   Historical Provider, MD   carvedilol (COREG) 3.125 MG tablet Take 3.125 mg by mouth 2 times daily (with meals)  8/31/19   Historical Provider, MD   atorvastatin (LIPITOR) 80 MG tablet  8/31/19   Historical Provider, MD aspirin 81 MG EC tablet 81 mg  in the morning. 9/5/19   Historical Provider, MD   montelukast (SINGULAIR) 10 MG tablet Take 10 mg by mouth nightly    Historical Provider, MD       Current Medications:    Current Facility-Administered Medications: [START ON 8/3/2022] aspirin EC tablet 81 mg, 81 mg, Oral, Daily  carvedilol (COREG) tablet 3.125 mg, 3.125 mg, Oral, BID WC  fluticasone (FLONASE) 50 MCG/ACT nasal spray 1 spray, 1 spray, Each Nostril, Daily  [Held by provider] lisinopril (PRINIVIL;ZESTRIL) tablet 5 mg, 5 mg, Oral, Daily  pantoprazole (PROTONIX) tablet 40 mg, 40 mg, Oral, Daily  sodium chloride flush 0.9 % injection 5-40 mL, 5-40 mL, IntraVENous, 2 times per day  sodium chloride flush 0.9 % injection 5-40 mL, 5-40 mL, IntraVENous, PRN  0.9 % sodium chloride infusion, , IntraVENous, PRN  acetaminophen (TYLENOL) tablet 650 mg, 650 mg, Oral, Q6H PRN **OR** acetaminophen (TYLENOL) suppository 650 mg, 650 mg, Rectal, Q6H PRN  polyethylene glycol (GLYCOLAX) packet 17 g, 17 g, Oral, Daily PRN  promethazine (PHENERGAN) tablet 12.5 mg, 12.5 mg, Oral, Q6H PRN **OR** ondansetron (ZOFRAN) injection 4 mg, 4 mg, IntraVENous, Q6H PRN  nitroGLYCERIN (NITROSTAT) SL tablet 0.4 mg, 0.4 mg, SubLINGual, Q5 Min PRN  enoxaparin (LOVENOX) injection 90 mg, 1 mg/kg, SubCUTAneous, BID  albuterol (PROVENTIL) nebulizer solution 2.5 mg, 2.5 mg, Nebulization, 4x Daily PRN  perflutren lipid microspheres (DEFINITY) injection 1.65 mg, 1.5 mL, IntraVENous, ONCE PRN    Allergies:  Atorvastatin, Codeine, Morphine, Rosuvastatin, and Sulfa antibiotics  Lipitor/Crestor: myalgias  Lisinopril: caused hypotension and syncope    Social History:    2-3 packs per day x 25 years quit in 1/2019  VOdka 2-3 drinks 4-5 x a week  Denies illicit Drugs  Activity: Lives with wife and his daughters. Works Full time job and also has a construction business and owns rentals. Climbs ladders, lifts heavy objects with no CP or SOB. + Drives. No assistivce devices. Family History:   Father alive 1st MI in his 52's with multiple recurrent MI's. No hx of CABG/PPM/ICD  Mother alive mo hx CAD, PPM, ICD  Brother  age 58 from MI  Brother alive with MI s/p PCI age 62  Great Uncle with ICD      REVIEW OF SYSTEMS:   See HPI    PHYSICAL EXAM:   /82   Pulse (!) 48   Temp 97.9 °F (36.6 °C) (Temporal)   Resp 16   Ht 5' 8\" (1.727 m)   Wt 200 lb 9.6 oz (91 kg)   SpO2 98%   BMI 30.50 kg/m²   CONST:  Well developed, well nourished who appears of stated age. Awake, alert and cooperative. No apparent distress. HEENT:   Head- Normocephalic, atraumatic   Eyes- Conjunctivae pink, anicteric  Throat- Oral mucosa pink and moist  Neck-  No stridor, trachea midline, no jugular venous distention. No carotid bruit. CHEST: Chest symmetrical and non-tender to palpation. No accessory muscle use or intercostal retractions  RESPIRATORY: Lung sounds - clear throughout fields   CARDIOVASCULAR:     Heart Ausculation- Regular rate and rhythm, no murmur. No s3, s4 or rub   PV: No lower extremity edema. No varicosities. Pedal pulses palpable, no clubbing or cyanosis   ABDOMEN: Soft, non-tender to light palpation. Bowel sounds present. No palpable masses no organomegaly; no abdominal bruit  MS: Good muscle strength and tone. No atrophy or abnormal movements. : Deferred  SKIN: Warm and dry no statis dermatitis or ulcers   NEURO / PSYCH: Oriented to person, place and time. Speech clear and appropriate. Follows all commands. Pleasant affect     DATA:    ECG as per Dr Modesta Mcdowell interpretation  Tele strips: SB 40-50's    Diagnostic:    2022 CT Head: No acute intracranial abnormality.     2 View CXR 2022: Slight pulmonary venous hypertension, no other acute process identified    Intake/Output Summary (Last 24 hours) at 2022 1015  Last data filed at 2022 0736  Gross per 24 hour   Intake 0 ml   Output --   Net 0 ml       Labs:   CBC:   Recent Labs     22  1629   WBC 12.1*   HGB 14.3   HCT 41.2        BMP:   Recent Labs     08/01/22  1629      K 3.7   CO2 21*   BUN 15   CREATININE 1.1   LABGLOM >60   CALCIUM 8.5*     CARDIAC ENZYMES:  Recent Labs     08/01/22  1629 08/01/22  1802 08/01/22  1919 08/02/22  0310 08/02/22  0828   TROPHS 10 25* 61* 781* 594*     LIVER PROFILE:  Recent Labs     08/01/22  1629   AST 19   ALT 23   LABALBU 4.0   Electronically signed by Marilyn Rodas. LM Gallegos on 8/2/2022 at 10:15 AM      I independently performed an evaluation on the patient. I have reviewed the above documentation completed by the advance practitioner. Please see my additional contributions to the HPI, physical exam, assessment and medical decision making. Physical Exam   /82   Pulse (!) 48   Temp 97.9 °F (36.6 °C) (Temporal)   Resp 16   Ht 5' 8\" (1.727 m)   Wt 200 lb 9.6 oz (91 kg)   SpO2 98%   BMI 30.50 kg/m²   Constitutional: Oriented to person, place, and time. Well-developed and well-nourished. No distress. Head: Normocephalic and atraumatic. Eyes: EOM are normal. Pupils are equal, round, and reactive to light. Neck: Normal range of motion. Neck supple. No hepatojugular reflux and no JVD present. Carotid bruit is not present. No tracheal deviation present. No thyromegaly present. Cardiovascular: Normal rate, regular rhythm, normal heart sounds and intact distal pulses. Exam reveals no gallop and no friction rub. No murmur heard. Pulmonary/Chest: Effort normal and breath sounds normal. No respiratory distress. No wheezes. No rales. No tenderness. Abdominal: Soft. Bowel sounds are normal. No distension and no mass. No tenderness. No rebound and no guarding. Musculoskeletal: Normal range of motion. No edema and no tenderness. Lymphadenopathy:   No cervical adenopathy. Neurological: Alert and oriented to person, place, and time. Skin: Skin is warm and dry. No rash noted. Not diaphoretic. No erythema.    Psychiatric: Normal mood and affect. Behavior is normal.     See accompanying documentation for full consult. ASSESSMENT AND PLAN:  1. Chest pain/NSTEMI/CAD:    Hx by patient of LAD stent 8/2019 at [facility] and subsequent cath that was medically managed 2/2022 at [facility]. Records pending. Typical presenting symptoms. Chest pain free now. Echo. Recommend cath AUC 8/4. May transfer to [facility], if not then cath in am.    ASA/BB/brilinta/lovenox/nitrate. 2. Lipids: Intolerant to statins. On PCSK9.    3. Prior Tobacco    Melissa Maria D.O.   Cardiologist  Cardiology, [address]

## 2022-08-02 NOTE — PROGRESS NOTES
Verbal order: Ok to transfer patient to [facility] from cardiology standpoint (Dr. Gilford Sievert). Notified Dr. Everardo Balbuena.

## 2022-08-02 NOTE — CARE COORDINATION
Social Work Discharge Planning:  Cardiology consult. SW met with patient at bedside, pt lives with wife in a 2 story home. Patient independent prior to admission and no hx with HHC or SNF. Patient PCP is Dr. Carey Yun and pharmacy is [facility]. Patient spouse will transport the patient home at discharge. SW will continue to follow and assist with transition of care.    Electronically signed by AYLA Zuniga on 8/2/2022 at 9:58 AM

## 2022-08-02 NOTE — ED NOTES
Pain increased to 4/10.  ordered to give nitro. Pt states pain down to a 0.       Ayaka Dorantes RN  08/01/22 [id number]

## 2022-08-02 NOTE — PROGRESS NOTES
Perfect served [employer] with troponin levels [employer] on call. Orders given.     Notified Eyal Ortiz of cardiology consult

## 2022-08-02 NOTE — ED NOTES
Pt reported pain down to 1/10. HR in 50s. Dr notified and ordered to hold nitro order at this time.      Gregory Thompson, OSKAR  08/01/22 [id number]

## 2022-08-02 NOTE — PROGRESS NOTES
Troy Redmond from [facility] called to initiate transfer. Covid test required and in process. Notified Dr Ligia Merchant. Will await further orders.

## 2022-08-02 NOTE — H&P
[facility]  History and Physical      CHIEF COMPLAINT:    No chief complaint on file. Patient of Cassy Hernandez MD presents with:  NSTEMI (non-ST elevated myocardial infarction) [facility])    History of Present Illness:   Patient is a 48-year-old male with a past medical history of diverticulitis, hyperlipidemia, kidney stones, and chronic sinusitis. Patient presented to [facility] ED with complaints of chest pain with exertion. Patient described chest pain as crushing in nature substernal radiating up to his neck. Patient also admits to having a headache as well as nausea and diaphoresis. Patient took Tylenol for his headache which it did improve. Patient was given nitroglycerin rhythm and aspirin that improved some of his symptoms. ER work-up revealed elevated troponins high as 594, WBC 12.1. Decision was made to transfer patient to [facility], for further testing and treatment. Patient follows with a cardiologist in [place] and he was last seen on 2/2022 he underwent a left heart cath and TTE without any intervention for intermittent chest pain. Patient is alert and oriented on examination. Patient stressed that he would like to go to [facility] in [place] to see his cardiologist there. Arrangements were made patient will be transferred to [facility] via ambulance to undergo cardiac catheterization. REVIEW OF SYSTEMS:  Pertinent negatives are above in HPI. 10 point ROS otherwise negative. Past Medical History:   Diagnosis Date    Chronic sinus complaints     Diverticulitis     Hyperlipidemia     Kidney stone          Past Surgical History:   Procedure Laterality Date    BACK SURGERY      HERNIA REPAIR         Medications Prior to Admission:    Medications Prior to Admission: empagliflozin (JARDIANCE) 10 MG tablet, Take 10 mg by mouth in the morning.   meloxicam (MOBIC) 15 MG tablet, Take 1 tablet by mouth daily  albuterol sulfate HFA (VENTOLIN HFA) 108 (90 Base) MCG/ACT inhaler, Inhale 2 puffs into the lungs 4 times daily as needed for Wheezing (Patient not taking: Reported on 8/2/2022)  meclizine (ANTIVERT) 12.5 MG tablet, Take 1 tablet by mouth 3 times daily as needed for Dizziness (Patient not taking: Reported on 8/2/2022)  cetirizine (ZYRTEC) 10 MG tablet, Take 1 tablet by mouth daily  fluticasone (FLONASE ALLERGY RELIEF) 50 MCG/ACT nasal spray, 1 spray by Each Nostril route daily  Cyanocobalamin (B-12) 5000 MCG CAPS, Take 1 capsule by mouth daily  BRILINTA 90 MG TABS tablet, Take 90 mg by mouth 2 times daily   pantoprazole (PROTONIX) 40 MG tablet, Take 40 mg by mouth daily   lisinopril (PRINIVIL;ZESTRIL) 5 MG tablet, Take 5 mg by mouth daily  (Patient not taking: Reported on 2/3/2710)  ONETOUCH DELICA LANCETS 52Y MISC,   ONE TOUCH ULTRA TEST strip,   carvedilol (COREG) 3.125 MG tablet, Take 3.125 mg by mouth 2 times daily (with meals)   atorvastatin (LIPITOR) 80 MG tablet,   aspirin 81 MG EC tablet, 81 mg  in the morning.  montelukast (SINGULAIR) 10 MG tablet, Take 10 mg by mouth nightly    Note that the patient's home medications were reviewed and the above list is accurate to the best of my knowledge at the time of the exam.    Allergies:    Lisinopril, Atorvastatin, Codeine, Morphine, Rosuvastatin, and Sulfa antibiotics    Social History:    reports that he quit smoking about 3 years ago. His smoking use included cigarettes. He started smoking about 35 years ago. He has a 36.00 pack-year smoking history. He has never used smokeless tobacco. He reports current alcohol use. He reports that he does not use drugs. Family History:   family history includes Heart Attack in his father.       PHYSICAL EXAM:    Vitals:  /82   Pulse (!) 48   Temp 97.9 °F (36.6 °C) (Temporal)   Resp 16   Ht 5' 8\" (1.727 m)   Wt 200 lb 9.6 oz (91 kg)   SpO2 98%   BMI 30.50 kg/m²       General appearance: NAD, conversant, pleasant  Eyes: Sclerae anicteric, PERRLA  HEENT: AT/NC, MMM  Neck: FROM, supple, no thyromegaly  Lymph: No cervical / supraclavicular lymphadenopathy  Lungs: Clear to auscultation, WOB normal  CV: RRR, no MRGs, no lower extremity edema  Abdomen: Soft, non-tender; no masses or HSM, +BS  Extremities: FROM without synovitis. No clubbing or cyanosis of the hands. Skin: no rash, induration, lesions, or ulcers  Psych: Calm and cooperative. Normal judgement and insight. Normal mood and affect. Neuro: Alert and interactive, face symmetric, speech fluent. LABS:  All labs reviewed. Of note:  CBC with Differential:    Lab Results   Component Value Date/Time    WBC 12.1 08/01/2022 04:29 PM    RBC 4.47 08/01/2022 04:29 PM    HGB 14.3 08/01/2022 04:29 PM    HCT 41.2 08/01/2022 04:29 PM     08/01/2022 04:29 PM    MCV 92.2 08/01/2022 04:29 PM    MCH 32.0 08/01/2022 04:29 PM    MCHC 34.7 08/01/2022 04:29 PM    RDW 12.7 08/01/2022 04:29 PM    LYMPHOPCT 19.1 08/01/2022 04:29 PM    MONOPCT 6.7 08/01/2022 04:29 PM    BASOPCT 0.9 08/01/2022 04:29 PM    MONOSABS 0.81 08/01/2022 04:29 PM    LYMPHSABS 2.30 08/01/2022 04:29 PM    EOSABS 0.15 08/01/2022 04:29 PM    BASOSABS 0.11 08/01/2022 04:29 PM     CMP:    Lab Results   Component Value Date/Time     08/01/2022 04:29 PM    K 3.7 08/01/2022 04:29 PM     08/01/2022 04:29 PM    CO2 21 08/01/2022 04:29 PM    BUN 15 08/01/2022 04:29 PM    CREATININE 1.1 08/01/2022 04:29 PM    GFRAA >60 08/01/2022 04:29 PM    LABGLOM >60 08/01/2022 04:29 PM    GLUCOSE 114 08/01/2022 04:29 PM    PROT 6.4 08/01/2022 04:29 PM    LABALBU 4.0 08/01/2022 04:29 PM    CALCIUM 8.5 08/01/2022 04:29 PM    BILITOT 0.6 08/01/2022 04:29 PM    ALKPHOS 86 08/01/2022 04:29 PM    AST 19 08/01/2022 04:29 PM    ALT 23 08/01/2022 04:29 PM       Imaging:  CXR: Slight pulmonary venous hypertension, no other acute process identified.     CT head: WNL    EKG:  I've personally reviewed the patient's EKG:  Sinus bradycardia    Telemetry:  I've personally reviewed the patient's telemetry:      ASSESSMENT/PLAN:  Principal Problem:    NSTEMI (non-ST elevated myocardial infarction) ([address]  Resolved Problems:    * No resolved hospital problems. *    50-year-old male with a history of hyperlipidemia presents to the ED with complaints of chest pain and is admitted to telemetry unit with    NSTEMI  ASA  Cardiology following  Patient to transfer to [facility] at the patient's request to undergo cardiac catheterization with his cardiologist that he follows there. Arrangements have been made    Code status: Full  Requires inpatient level of care  DAPHNE Bass CNP    10:44 AM  8/2/2022     . Above note edited to reflect my thoughts     I personally saw, examined and provided care for the patient. Radiographs, labs and medication list were reviewed by me independently. The case was discussed in detail and plans for care were established. Review of Brenna YING CNP, documentation was conducted and revisions were made as appropriate directly by me. I agree with the above documented exam, problem list, and plan of care.      Eris Atkinson MD  8/2/2022

## 2022-08-02 NOTE — H&P
not taking: Reported on 8/2/2022 7/1/21   Jeffy Bland DO   cetirizine (ZYRTEC) 10 MG tablet Take 1 tablet by mouth daily 5/6/21   Jeffy Bland DO   fluticasone [facility] ALLERGY RELIEF) 50 MCG/ACT nasal spray 1 spray by Each Nostril route daily 5/6/21   Jeffy Bland DO   Cyanocobalamin (B-12) 5000 MCG CAPS Take 1 capsule by mouth daily    Historical Provider, MD   BRILINTA 90 MG TABS tablet Take 90 mg by mouth 2 times daily  9/5/19   Historical Provider, MD   pantoprazole (PROTONIX) 40 MG tablet Take 40 mg by mouth daily  8/31/19   Historical Provider, MD   lisinopril (PRINIVIL;ZESTRIL) 5 MG tablet Take 5 mg by mouth daily   Patient not taking: Reported on 8/2/2022 8/31/19   Historical Provider, MD Abelardo Segura [facility] [address] [facility]  7/1/19   Historical Provider, MD   ONE TOUCH ULTRA TEST strip  7/1/19   Historical Provider, MD   carvedilol (COREG) 3.125 MG tablet Take 3.125 mg by mouth 2 times daily (with meals)  8/31/19   Historical Provider, MD   atorvastatin (LIPITOR) 80 MG tablet  8/31/19   Historical Provider, MD   aspirin 81 MG EC tablet 81 mg  in the morning. 9/5/19   Historical Provider, MD   montelukast (SINGULAIR) 10 MG tablet Take 10 mg by mouth nightly    Historical Provider, MD         Allergies:  Lisinopril, Atorvastatin, Codeine, Morphine, Rosuvastatin, and Sulfa antibiotics    Social History:    TOBACCO:   reports that he quit smoking about 3 years ago. His smoking use included cigarettes. He started smoking about 35 years ago. He has a 36.00 pack-year smoking history. He has never used smokeless tobacco.  ETOH:   reports current alcohol use. Family History:    Reviewed in detail and negative for DM, CAD, Cancer, CVA. Positive as follows\"      Problem Relation Age of Onset    Heart Attack Father        REVIEW OF SYSTEMS:   Pertinent positives as noted in the HPI. All other systems reviewed and negative.     PHYSICAL EXAM:  /81   Pulse 56   Temp 98.1 °F (36.7 °C) (Temporal)   Resp 16   Ht 5' 8\" (1.727 m)   Wt 200 lb 9.6 oz (91 kg)   SpO2 97%   BMI 30.50 kg/m²   General appearance: No apparent distress, appears stated age and cooperative. HEENT: Normal cephalic, atraumatic without obvious deformity. Pupils equal, round, and reactive to light. Extra ocular muscles intact. Conjunctivae/corneas clear. Neck: Supple, with full range of motion. No jugular venous distention. Trachea midline. Respiratory: Normal respiratory effort, no wheezing, rales or crackles  Cardiovascular: Regular heartbeats, no murmur gallops or rubs  Abdomen: Distended, normal bowel sounds, nontender, no visceromegaly  Musculoskeletal: No clubbing, cyanosis, edema of bilateral lower extremities. Brisk capillary refill. Skin: Normal skin color. No rashes or lesions. Neurologic:  Neurovascularly intact without any focal sensory/motor deficits. Cranial nerves: II-XII intact, grossly non-focal.  Psychiatric: Alert and oriented, thought content appropriate, normal insight    Reviewed EKG and CXR personally      CBC:   Recent Labs     08/01/22  1629   WBC 12.1*   RBC 4.47   HGB 14.3   HCT 41.2   MCV 92.2   RDW 12.7        BMP:   Recent Labs     08/01/22  1629      K 3.7   *   CO2 21*   BUN 15   CREATININE 1.1     LFT:  Recent Labs     08/01/22  1629   PROT 6.4   ALKPHOS 86   ALT 23   AST 19   BILITOT 0.6     CE:  Recent Labs     08/02/22  0828   CKTOTAL 360*     PT/INR: No results for input(s): INR, APTT in the last 72 hours. BNP: No results for input(s): BNP in the last 72 hours.   ESR: No results found for: SEDRATE  CRP: No results found for: CRP  D Dimer:   Lab Results   Component Value Date    DDIMER <200 03/21/2020      Folate and B12: No results found for: VCPXYUUF83, No results found for: FOLATE  Lactic Acid: No results found for: LACTA  Thyroid Studies: No results found for: TSH, H2SNLIH, V9VJACB, THYROIDAB    Oupatient labs:  No results found for: CHOL, TRIG, HDL, LDLCALC, TSH, PSA, INR, LABA1C    Urinalysis:  No results found for: NITRU, WBCUA, BACTERIA, RBCUA, BLOODU, SPECGRAV, GLUCOSEU    Imaging:  Echo Complete    Result Date: 8/2/2022  Transthoracic Echocardiography Report (TTE)  Demographics   Patient Name    Tono Napoles Gender            Male                  J   Medical Record  [id number]     Room Number       0185  Number   Account #       [de-identified]    Procedure Date    08/02/2022   Corporate ID                 Ordering          Lee Jose Enriquemary                                Physician   Accession       [id number]   Referring  Number                       Physician   Date of Birth   1970   Sonographer       Columba Watson [facility]   Age             45 year(s)   Interpreting      [address]                               Physician         Physician Cardiology                                                 Elise Hadley DO                                Any Other  Procedure Type of Study   TTE procedure:Echo Complete W/Doppler & Color Flow. Procedure Date Date: 08/02/2022 Start: 09:54 AM Study Location: Portable Technical Quality: Adequate visualization Indications:NSTEMI. Patient Status: Routine Height: 68 inches Weight: 220 pounds BSA: 2.13 m^2 BMI: 33.45 kg/m^2 Rhythm: Within normal limits BP: 123/78 mmHg  Findings   Left Ventricle  Ejection fraction is visually estimated at 50-55%. The apex is  hypokinetic. Normal left ventricular wall thickness. Left ventricle size  is normal. Normal left ventricular diastolic filling pattern for age. Right Ventricle  Normal right ventricle structure and function. Left Atrium  Left atrial volume index of 30 ml per meters squared BSA. Right Atrium  Normal right atrium. Mitral Valve  Structurally normal mitral valve. No evidence of mitral valve stenosis. Physiologic and/or trace mitral regurgitation is present. Tricuspid Valve  The tricuspid valve appears structurally normal.  Mild tricuspid regurgitation.   RVSP is 32 mmHg.   Aortic Valve  The aortic valve is trileaflet. No hemodynamically significant aortic  stenosis is present. No evidence of aortic valve regurgitation. Pulmonic Valve  Pulmonic valve is structurally normal. Physiologic and/or trace pulmonic  regurgitation present. Pericardial Effusion  No evidence for hemodynamically significant pericardial effusion. Aorta  Normal aortic root and ascending aorta. Miscellaneous  The inferior vena cava diameter is normal with normal respiratory  variation. Conclusions   Summary  Ejection fraction is visually estimated at 50-55%. The apex is hypokinetic. Normal right ventricle structure and function. Left atrial volume index of 30 ml per meters squared BSA. Physiologic and/or trace mitral regurgitation is present. Mild tricuspid regurgitation. RVSP is 32 mmHg. No evidence for hemodynamically significant pericardial effusion.    Signature   ----------------------------------------------------------------  Electronically signed by Mendy Tong DO(Interpreting  physician) on 08/02/2022 11:30 AM  ----------------------------------------------------------------  M-Mode/2D Measurements & Calculations   LV Diastolic    LV Systolic Dimension: 4.3   AV Cusp Separation: 1.4 cmLA  Dimension: 5.7  cm                           Dimension: 3.8 cmAO Root  cm              LV Volume Diastolic: 756.0   Dimension: 3.2 cm  LV FS:24.6 %    ml  LV PW           LV Volume Systolic: 84 ml  Diastolic: 1.1  LV EDV/LV EDV Index: 172.4  cm              ml/81 ml/m^2LV ESV/LV ESV    RV Diastolic Dimension: 3.7  Septum          Index: 84 ml/39ml/ m^2       cm  Diastolic: 1 cm EF Calculated: 51.3 %                  LV Mass Index: 113 l/min*m^2 Ascending Aorta: 3.2 cm  LV Mass: 241.35 LV Length: 8.1 cm            LA volume/Index: 63.3 ml  g                                            /30ml/m^2                  LVOT: 2 cm                   RA Area: 14 cm^2  Doppler Measurements & Calculations   MV Peak E-Wave: 0.94 AV Peak Velocity:     LVOT Peak Velocity: 1.02 m/s  m/s                  1.37 m/s              LVOT Mean Velocity: 0.61 m/s  MV Peak A-Wave: 0.63 AV Peak Gradient:     LVOT Peak Gradient: 4.2  m/s                  7.53 mmHg             mmHgLVOT Mean Gradient: 1.8  MV E/A Ratio: 1.48   AV Mean Velocity:     mmHg  MV Peak Gradient:    0.99 m/s              Estimated RVSP: 32 mmHg  4.8 mmHg             AV Mean Gradient: 4.3 Estimated RAP:3 mmHg  MV Mean Gradient:    mmHg  1.3 mmHg             AV VTI: 27.8 cm  MV Mean Velocity:    AV Area               TR Velocity:2.69 m/s  0.51 m/s             (Continuity):2.08     TR Gradient:29.03 mmHg  MV Deceleration      cm^2                  PV Peak Velocity: 1.06 m/s  Time: 144.7 msec                           PV Peak Gradient: 4.52 mmHg  MV P1/2t: 61.3 msec  LVOT VTI: 18.4 cm     PV Mean Velocity: 0.7 m/s  MVA by PHT:3.59 cm^2 IVRT: 23.1 msec       PV Mean Gradient: 2.3 mmHg  MV Area              Estimated PASP: 32.03  (continuity): 1.9    mmHg  cm^2  MV E' Septal  Velocity: 0.09 m/s  MV E' Lateral  Velocity: 7 m/s  [email or web address] [id number]%[id number]    XR CHEST (2 VW)    Result Date: 8/1/2022  EXAMINATION: TWO XRAY VIEWS OF THE CHEST 8/1/2022 4:39 pm COMPARISON: 11/11/2014 HISTORY: ORDERING SYSTEM PROVIDED HISTORY: chest pain TECHNOLOGIST PROVIDED HISTORY: Reason for exam:->chest pain FINDINGS: Heart is normal in size. Slight pulmonary venous hypertension. No pleural fluid or focal pneumonia. No evidence of pneumothorax. Osseous structures are within normal limits. Slight pulmonary venous hypertension, no other acute process identified.      CT Head WO Contrast    Result Date: 8/1/2022  EXAMINATION: CT OF THE HEAD WITHOUT CONTRAST  8/1/2022 5:29 pm TECHNIQUE: CT of the head was performed without the administration of intravenous contrast. Automated exposure control, iterative reconstruction, and/or weight based adjustment of the mA/kV was utilized to reduce the radiation dose to as low as reasonably achievable. COMPARISON: Comparison with the prior study 11/14/2020 and there is no significant interval change. HISTORY: ORDERING SYSTEM PROVIDED HISTORY: headache TECHNOLOGIST PROVIDED HISTORY: Reason for exam:->headache Has a \"code stroke\" or \"stroke alert\" been called? ->No Decision Support Exception - unselect if not a suspected or confirmed emergency medical condition->Emergency Medical Condition (MA) FINDINGS: BRAIN/VENTRICLES: There is no acute intracranial hemorrhage, mass effect or midline shift. No abnormal extra-axial fluid collection. The gray-white differentiation is maintained without evidence of an acute infarct. There is no evidence of hydrocephalus. ORBITS: The visualized portion of the orbits demonstrate no acute abnormality. SINUSES: The visualized paranasal sinuses and mastoid air cells demonstrate no acute abnormality. SOFT TISSUES/SKULL:  No acute abnormality of the visualized skull or soft tissues. No acute intracranial abnormality. ASSESSMENT:    Principal Problem:    NSTEMI (non-ST elevated myocardial infarction) ([facility] [address]  Resolved Problems:    * No resolved hospital problems. *  Prior myocardial infarction and status post stent  Hyperlipidemia  History of intolerance to statins  Sinus bradycardia  Mild leukocytosis with no signs of infection in the course of non-STEMI    PLAN:    -Transthoracic echo done today LVEF 50 to 55%. The apex is hypokinetic. MR right ventricle structure and function physiologic MR mild TR.   No pericardial effusion  -Transfer initiated by ambulance  -Continue Lovenox full dose  -Beta-blocker held given heart rate in the 50s  -Risk factor modification      Diet: Diet NPO Exceptions are: Sips of Water with Meds  Code Status: Full Code  Surrogate decision maker confirmed with patient:   Extended Emergency Contact Information  Primary Emergency Contact: Sanjuana Toure  Address: [address] Phone: [phone]  Relation: Spouse    DVT Prophylaxis: []Lovenox []Heparin []PCD [] [address] []Encouraged ambulation  Disposition: []Med/Surg [] Intermediate [] ICU/CCU  Admit status: [] Observation [] Inpatient     +++++++++++++++++++++++++++++++++++++++++++++++++  Dorothy Hale MD  [facility] - [address] [place] [address]  +++++++++++++++++++++++++++++++++++++++++++++++++  NOTE: This report was transcribed using voice recognition software. Every effort was made to ensure accuracy; however, inadvertent computerized transcription errors may be present.

## 2022-08-02 NOTE — PLAN OF CARE
Problem: Discharge Planning  Goal: Discharge to home or other facility with appropriate resources  Outcome: Progressing  Flowsheets  Taken 8/2/2022 0806  Discharge to home or other facility with appropriate resources: Identify barriers to discharge with patient and caregiver  Taken 8/2/2022 0714  Discharge to home or other facility with appropriate resources: Identify barriers to discharge with patient and caregiver     Problem: ABCDS Injury Assessment  Goal: Absence of physical injury  Outcome: Progressing

## 2022-08-02 NOTE — PROGRESS NOTES
Patient here for non-STEMI and being transferred for cardiac cath. Cath scheduled for tomorrow. Patient contacted his cardiology at [facility] they are able to accept him for cardiac cath today. Contact transfer center did arrange conference with Dr. Alejandro Sewell, cardiologist and he agreed to have the patient transferred for cardiac catheter today. Patient primary attending is actually [facility] and this team has agreed to transfer pt as per their notes.

## 2022-08-02 NOTE — PROGRESS NOTES
Trino Oseguera from [facility] called to get in touch with attending. Tone served Dr. Blaze Marie with phone number [phone].

## 2022-08-02 NOTE — ED NOTES
Pt reports carrying scaffolding earlier today and approximately an hour later, experienced chest pressure with n/v. Pt had MI with stent placement 3 years ago so came to ER. Pt received 1 nitro and asa by EMS who stated that it did not relieve pt's pain however after ems left, pt stated that his pain had improved from 7 down to 4/10. Pt AOx4, even respirations, spouse at bedside. EKG/IV/labs completed.  in to see pt.      Criss Cruz RN  08/01/22 6518

## 2022-08-02 NOTE — PROGRESS NOTES
Nurse to Nurse handoff called and given to 8[address] Dl Ramos at [facility].  Patient going to room 2010-2

## 2022-08-02 NOTE — PROGRESS NOTES
2032 Dollie Gaucher, RN, [facility]    Dx: acute coronary syndrome    2035 Dr. Merwyn Bussing Burnie Hamman) spoke with Dr. Grace Rutherford for ED to ED transfer for Cath lab procedure in morning. Acceptance not successful. 2147 Dr. Zarina Holland spoke with Dr. Radha Diallo, accepting physician. Bed will be secured. If not available in time for cath procedure, patient will be sent ED to ED. Marcelino Gooden RN, [address] [facility]  Bed: 1947N  N2N#: [phone]  [facility] ETA [phone]

## 2022-08-02 NOTE — LETTER
[place] Department Medicaid  CERTIFICATION OF NECESSITY  FOR NON-EMERGENCY TRANSPORTATION   BY GROUND AMBULANCE      Individual Information   1. Name: Althea Zamarripa 2. [place] Medicaid Billing Number:    3. Address: [address] [place] [address]      Transportation Provider Information   4. Provider Name:    5. [place] Medicaid Provider Number:  National Provider Identifier (NPI):      Certification  7. Criteria:  During transport, this individual requires:  [x] Medical treatment or continuous     supervision by an EMT. [] The administration or regulation of oxygen by another person. [] Supervised protective restraint. 8. Period Beginning Date: 8/2/2022     9. Length  [x] Not more than 10 day(s)  [] One Year     Additional Information Relevant to Certification   10. Comments or Explanations, If Necessary or Appropriate  NSTEMI  Transthoracic echo done today LVEF 50 to 55%. The apex is hypokinetic. MR right ventricle structure and function physiologic MR mild TR. No pericardial effusion  -Transfer initiated by ambulance  -Continue Lovenox full dose  -Beta-blocker held given heart rate in the 50s  -Risk factor modification     Certifying Practitioner Information   11. Name of Practitioner: Bambi Solis MD   12. [place] Medicaid Provider Number, If Applicable: [address] Provider Identifier (NPI):      Signature Information   14. Date of Signature: 8/2/2022   15. Name of Person Signing:   Mamie Erwin   16. Signature and Professional Designation: Mamie Erwin     [facility] [id number]  Rev. 7/2015  [address] Encounter Date/Time: 8/2/2022 [address] Account: [de-identified]    MRN: [id number]    Patient: Althea Zamarripa    Contact Serial #: [id number]      ENCOUNTER          Patient Class: I Private Enc? No Unit RM BD: SEYZ [id number] 6320/6320-A   Hospital Service:  INM   Encounter DX: NSTEMI (non-ST elevated *   ADM Provider: Irving Hanna MD   Procedure:     ATT Provider:

## 2022-08-02 NOTE — PROGRESS NOTES
Called Vitaliy at access center to arrange for transport. [facility] eta 90 min-2h. We have Tdap in stock    cost about $70 plus admin fee    Pharmacy is cheaper    Health dept is free.

## 2022-08-02 NOTE — DISCHARGE INSTR - COC
Continuity of Care Form    Patient Name: Ashley Dhillon   :  1970  MRN:  [id number]    Admit date:  2022  Discharge date:  2022    Code Status Order: Full Code   Advance Directives:     Admitting Physician:  Cristi Christianson MD  PCP: Jane Jerez MD    Discharging Nurse: [address] Unit/Room#: 6234/8078-I  Discharging Unit Phone Number: [phone]    Emergency Contact:   Extended Emergency Contact Information  Primary Emergency Contact: [facility]  Address: [address] Phone: [phone]  Relation: Spouse    Past Surgical History:  Past Surgical History:   Procedure Laterality Date    BACK SURGERY      HERNIA REPAIR         Immunization History:   Immunization History   Administered Date(s) Administered    Pneumococcal Polysaccharide ([id number]) 07/10/2012       Active Problems:  Patient Active Problem List   Diagnosis Code    Essential hypertension I10    Hyperlipidemia LDL goal <100 E78.5    Chest pain R07.9    CAD (coronary artery disease), native coronary artery I25.10    Obesity (BMI 30-39. 9) E66.9    S/P coronary artery stent placement Z95.5    Chest congestion R09.89    NSTEMI (non-ST elevated myocardial infarction) ([facility]) I21.4       Isolation/Infection:   Isolation            No Isolation          Patient Infection Status       Infection Onset Added Last Indicated Last Indicated By Review Planned Expiration Resolved Resolved By    None active    Resolved    COVID-19 (Rule Out) 22 COVID-19 Ambulatory (Ordered)   22 Rule-Out Test Resulted            Nurse Assessment:  Last Vital Signs: /81   Pulse 56   Temp 98.1 °F (36.7 °C) (Temporal)   Resp 16   Ht 5' 8\" (1.727 m)   Wt 200 lb 9.6 oz (91 kg)   SpO2 97%   BMI 30.50 kg/m²     Last documented pain score (0-10 scale):    Last Weight:   Wt Readings from Last 1 Encounters:   22 200 lb 9.6 oz (91 kg)     Mental Status: oriented, alert, coherent, logical, thought processes intact, and able to concentrate and follow conversation    IV Access:  - Peripheral IV - site  L Antecubital, insertion date: 8/2/2022    Nursing Mobility/ADLs:  Walking   Independent  Transfer  Independent  Bathing  Independent  Dressing  Independent  Toileting  Independent  Feeding  Independent  Med [facility] Delivery   none    Wound Care Documentation and Therapy:        Elimination:  Continence: Bowel: No  Bladder: No  Urinary Catheter: None   Colostomy/Ileostomy/Ileal Conduit: No       Date of Last BM: 08/02/2022    Intake/Output Summary (Last 24 hours) at 8/2/2022 1229  Last data filed at 8/2/2022 0736  Gross per 24 hour   Intake 0 ml   Output --   Net 0 ml     No intake/output data recorded. Safety Concerns:     None    Impairments/Disabilities:      None    Nutrition Therapy:  Current Nutrition Therapy:   - Oral Diet:  NPO    Routes of Feeding: Oral  Liquids: No Restrictions  Daily Fluid Restriction: no  Last Modified Barium Swallow with Video (Video Swallowing Test): not done    Treatments at the Time of Hospital Discharge:   Respiratory Treatments: none  Oxygen Therapy:  is not on home oxygen therapy.   Ventilator:    - No ventilator support    Rehab Therapies: none  Weight Bearing Status/Restrictions: No weight bearing restrictions  Other Medical Equipment (for information only, NOT a DME order):  n/a  Other Treatments: n/a    Patient's personal belongings (please select all that are sent with patient):  None    RN SIGNATURE:  Electronically signed by Zenaida Ramesh RN on 8/2/22 at 12:35 PM EDT    CASE MANAGEMENT/SOCIAL WORK SECTION    Inpatient Status Date: ***    Readmission Risk Assessment Score:  Readmission Risk              Risk of Unplanned Readmission:  [id number]           Discharging to Facility/ Agency   Name:   Address:  Phone:  Fax:    Dialysis Facility (if applicable)   Name:  Address:  Dialysis Schedule:  Phone:  Fax:    / signature: {Esignature:855048273}    PHYSICIAN SECTION    Prognosis: {Prognosis:6297782132}    Condition at Discharge: Lee Manzo Patient Condition:[id number]}    Rehab Potential (if transferring to Rehab): {Prognosis:2010121776}    Recommended Labs or Other Treatments After Discharge: ***    Physician Certification: I certify the above information and transfer of Curtis Ponce  is necessary for the continuing treatment of the diagnosis listed and that he requires {Admit to Appropriate Level of Care:39148} for {GREATER/LESS:484572217} 30 days.      Update Admission H&P: {CHP DME Changes in GLJDA:084995565}    PHYSICIAN SIGNATURE:  {Esignature:221947721}

## 2022-09-13 ENCOUNTER — OFFICE VISIT (OUTPATIENT)
Dept: PODIATRY | Age: 52
End: 2022-09-13
Payer: COMMERCIAL

## 2022-09-13 VITALS
BODY MASS INDEX: 31.17 KG/M2 | WEIGHT: 205 LBS | SYSTOLIC BLOOD PRESSURE: 130 MMHG | TEMPERATURE: 98.2 F | DIASTOLIC BLOOD PRESSURE: 80 MMHG

## 2022-09-13 DIAGNOSIS — M72.2 BILATERAL PLANTAR FASCIITIS: ICD-10-CM

## 2022-09-13 DIAGNOSIS — M77.31 CALCANEAL SPUR OF BOTH FEET: ICD-10-CM

## 2022-09-13 DIAGNOSIS — M77.32 CALCANEAL SPUR OF BOTH FEET: ICD-10-CM

## 2022-09-13 DIAGNOSIS — M72.2 PLANTAR FASCIAL FIBROMATOSIS: Primary | ICD-10-CM

## 2022-09-13 PROCEDURE — 20550 NJX 1 TENDON SHEATH/LIGAMENT: CPT | Performed by: PODIATRIST

## 2022-09-13 PROCEDURE — 99213 OFFICE O/P EST LOW 20 MIN: CPT | Performed by: PODIATRIST

## 2022-09-13 RX ORDER — MELOXICAM 15 MG/1
15 TABLET ORAL DAILY
Qty: 90 TABLET | Refills: 1 | Status: SHIPPED | OUTPATIENT
Start: 2022-09-13 | End: 2022-12-12

## 2022-09-13 RX ORDER — BETAMETHASONE SODIUM PHOSPHATE AND BETAMETHASONE ACETATE 3; 3 MG/ML; MG/ML
4 INJECTION, SUSPENSION INTRA-ARTICULAR; INTRALESIONAL; INTRAMUSCULAR; SOFT TISSUE ONCE
Status: COMPLETED | OUTPATIENT
Start: 2022-09-13 | End: 2022-09-13

## 2022-09-13 RX ORDER — BUPIVACAINE HYDROCHLORIDE 5 MG/ML
1 INJECTION, SOLUTION PERINEURAL ONCE
Status: COMPLETED | OUTPATIENT
Start: 2022-09-13 | End: 2022-09-13

## 2022-09-13 RX ADMIN — BUPIVACAINE HYDROCHLORIDE 5 MG: 5 INJECTION, SOLUTION PERINEURAL at 16:58

## 2022-09-13 RX ADMIN — BETAMETHASONE SODIUM PHOSPHATE AND BETAMETHASONE ACETATE 4 MG: 3; 3 INJECTION, SUSPENSION INTRA-ARTICULAR; INTRALESIONAL; INTRAMUSCULAR; SOFT TISSUE at 16:57

## 2022-09-13 NOTE — PROGRESS NOTES
22  Elena Doshi : 1970 Sex: male  Age: 45 y.o. Patient was referred by Ash Perera MD    Chief Complaint   Patient presents with    Foot Pain     Right Plantar fascitis flare up might need an inj today   Bought a foot massage        SUBJECTIVE patient is seen for follow-up of right heel pain patient relates that its been bothering him ever since 2 months ago no improvement.   HPI  Review of Systems  Const: Denies constitutional symptoms  Musculo: Denies symptoms other than stated above  Skin: Denies symptoms other than stated above       Current Outpatient Medications:     meloxicam (MOBIC) 15 MG tablet, Take 1 tablet by mouth daily, Disp: 90 tablet, Rfl: 1    empagliflozin (JARDIANCE) 10 MG tablet, Take 10 mg by mouth in the morning., Disp: , Rfl:     albuterol sulfate HFA (VENTOLIN HFA) 108 (90 Base) MCG/ACT inhaler, Inhale 2 puffs into the lungs 4 times daily as needed for Wheezing, Disp: 3 Inhaler, Rfl: 1    meclizine (ANTIVERT) 12.5 MG tablet, Take 1 tablet by mouth 3 times daily as needed for Dizziness, Disp: 20 tablet, Rfl: 0    cetirizine (ZYRTEC) 10 MG tablet, Take 1 tablet by mouth daily, Disp: 30 tablet, Rfl: 1    fluticasone (FLONASE ALLERGY RELIEF) 50 MCG/ACT nasal spray, 1 spray by Each Nostril route daily, Disp: 1 Bottle, Rfl: 1    Cyanocobalamin (B-12) 5000 MCG CAPS, Take 1 capsule by mouth daily, Disp: , Rfl:     BRILINTA 90 MG TABS tablet, Take 90 mg by mouth 2 times daily , Disp: , Rfl:     pantoprazole (PROTONIX) 40 MG tablet, Take 40 mg by mouth daily , Disp: , Rfl:     lisinopril (PRINIVIL;ZESTRIL) 5 MG tablet, Take 5 mg by mouth daily, Disp: , Rfl:     ONETOUCH DELICA LANCETS 83Z MISC, , Disp: , Rfl:     ONE TOUCH ULTRA TEST strip, , Disp: , Rfl:     carvedilol (COREG) 3.125 MG tablet, Take 3.125 mg by mouth 2 times daily (with meals) , Disp: , Rfl:     atorvastatin (LIPITOR) 80 MG tablet, , Disp: , Rfl:     aspirin 81 MG EC tablet, 81 mg  in the morning., Disp: , Rfl:     montelukast (SINGULAIR) 10 MG tablet, Take 10 mg by mouth nightly, Disp: , Rfl:   Allergies   Allergen Reactions    Lisinopril Other (See Comments)     Hypotension/Syncope    Atorvastatin Other (See Comments)    Codeine     Morphine     Rosuvastatin      Other reaction(s): Myalgia    Sulfa Antibiotics        Past Medical History:   Diagnosis Date    Chronic sinus complaints     Diverticulitis     Hyperlipidemia     Kidney stone      Past Surgical History:   Procedure Laterality Date    BACK SURGERY      HERNIA REPAIR       Family History   Problem Relation Age of Onset    Heart Attack Father      Social History     Socioeconomic History    Marital status:      Spouse name: Not on file    Number of children: Not on file    Years of education: Not on file    Highest education level: Not on file   Occupational History    Not on file   Tobacco Use    Smoking status: Former     Packs/day: 2.00     Years: 18.00     Pack years: 36.00     Types: Cigarettes     Start date: 3/20/1987     Quit date: 1/1/2019     Years since quitting: 3.7    Smokeless tobacco: Never   Vaping Use    Vaping Use: Never used   Substance and Sexual Activity    Alcohol use: Yes     Comment: Social. No coffee.  Does drink 3 120z mountain dew a day    Drug use: No    Sexual activity: Yes     Partners: Female   Other Topics Concern    Not on file   Social History Narrative    Not on file     Social Determinants of Health     Financial Resource Strain: Not on file   Food Insecurity: Not on file   Transportation Needs: Not on file   Physical Activity: Not on file   Stress: Not on file   Social Connections: Not on file   Intimate Partner Violence: Not on file   Housing Stability: Not on file       Vitals:    09/13/22 1633   BP: 130/80   Temp: 98.2 °F (36.8 °C)   TempSrc: Temporal   Weight: 205 lb (93 kg)       Focused Lower Extremity Physical Exam:  Vitals:    09/13/22 1633   BP: 130/80   Temp: 98.2 °F (36.8 °C)        Foot Exam     Ortho Exam    Vascular: pulses  dp  pt palpable  Capillary Refill Time:   Hair growth  Skin:    Edema:    Neurologic:      Musculoskeletal/ Orthopedic examination: Pain with palpation to the inferior aspect of the right heel pain with palpation along the lateral band of the right heel at the insertion of the plantar fascial muscle calcaneus  NAIL   Web space  Derm:          Assessment and Plan:Potential risks, complications, alternative treatments, and procedure prognosis were explained to the patient. Right foot lateral band  Verbal informed consent was obtained from the patient. Chlora prep preparation was performed over plantar fascia. 1 mL of 0.5% Marcaine plain and 1cc celestone Soluspan   injected in standard medial approach plantar fascia. Patient tolerated steroid injection well. Band-Aid applied. The patient was educated on a possible steroid flare and the use of ice with frozen water bottle 10 minutes each night discussed. Continue passive and active range of motion stretches and strengthening bilateral plantar fascia and Achilles tendons. Elziabeth Rose was seen today for foot pain. Diagnoses and all orders for this visit:    Plantar fascial fibromatosis    Calcaneal spur of both feet    Bilateral plantar fasciitis    Other orders  -     meloxicam (MOBIC) 15 MG tablet; Take 1 tablet by mouth daily      No follow-ups on file. Seen By:  Adelia Garner DPM      Document was created using voice recognition software. Note was reviewed, however may contain grammatical errors.

## 2022-09-30 ENCOUNTER — HOSPITAL ENCOUNTER (EMERGENCY)
Age: 52
Discharge: HOME | End: 2022-09-30
Payer: COMMERCIAL

## 2022-09-30 ENCOUNTER — APPOINTMENT (OUTPATIENT)
Dept: CT IMAGING | Age: 52
End: 2022-09-30
Payer: COMMERCIAL

## 2022-09-30 VITALS
HEIGHT: 68 IN | TEMPERATURE: 97.3 F | SYSTOLIC BLOOD PRESSURE: 127 MMHG | HEART RATE: 72 BPM | BODY MASS INDEX: 31.07 KG/M2 | DIASTOLIC BLOOD PRESSURE: 76 MMHG | RESPIRATION RATE: 16 BRPM | OXYGEN SATURATION: 100 % | WEIGHT: 205 LBS

## 2022-09-30 DIAGNOSIS — S09.90XA INJURY OF HEAD, INITIAL ENCOUNTER: Primary | ICD-10-CM

## 2022-09-30 PROCEDURE — 99284 EMERGENCY DEPT VISIT MOD MDM: CPT

## 2022-09-30 PROCEDURE — 70450 CT HEAD/BRAIN W/O DYE: CPT

## 2022-09-30 ASSESSMENT — PAIN - FUNCTIONAL ASSESSMENT
PAIN_FUNCTIONAL_ASSESSMENT: NONE - DENIES PAIN
PAIN_FUNCTIONAL_ASSESSMENT: NONE - DENIES PAIN

## 2022-09-30 NOTE — ED PROVIDER NOTES
[facility]     Department of Emergency Medicine   ED  Provider Note  Admit Date/RoomTime: 9/30/2022  2:09 PM  ED Room: /     Chief Complaint:   Headache and Dizziness (Hit head today, on thinners)    History of Present Illness      Stella Armstrong is a 47 y.o. old male who presents to the ED for head injury. Patient states he had his head earlier today while trying to move a saw. He denies falling to the ground or having any loss of consciousness. Patient does take Brilinta. He is denying any open wounds. He has no vision changes but does report some dizziness. He still able to ambulate independently. Patient denies chest pain, shortness of breath, pain with breathing, neck pain, back pain, abdominal pain, nausea, vomiting, or difficulty with ambulation or balance. Patient is alert and oriented x3 and in no apparent distress at this exam.  He is nontoxic-appearing. Patient is politely declining any pain medication at this time. ROS   Pertinent positives and negatives are stated within HPI, all other systems reviewed and are negative. Past Medical History:  has a past medical history of Chronic sinus complaints, Diverticulitis, Hyperlipidemia, and Kidney stone. Past Surgical History:  has a past surgical history that includes hernia repair and back surgery. Social History:  reports that he quit smoking about 3 years ago. His smoking use included cigarettes. He started smoking about 35 years ago. He has a 36.00 pack-year smoking history. He has never used smokeless tobacco. He reports current alcohol use. He reports that he does not use drugs. Family History: family history includes Heart Attack in his father. The patients home medications have been reviewed. Allergies: Lisinopril, Atorvastatin, Codeine, Morphine, Rosuvastatin, and Sulfa antibiotics  Allergies have been reviewed with patient.      Physical Exam   VS:  /76   Pulse 72   Temp 97.3 °F (36.3 °C) (Temporal) Resp 16   Ht 5' 8\" (1.727 m)   Wt 205 lb (93 kg)   SpO2 100%   BMI 31.17 kg/m²      Oxygen Saturation Interpretation: Normal.    Constitutional:  Alert and oriented x4, development consistent with age, NAD  HEENT:  NC/NT, PERRLA, EOMI,  TMs normal bilaterally, Airway patent, No pharyngeal erythema, no bruising, abrasions, or lacerations  Neck:  Normal ROM. Supple. No meningeal signs. No lymphadenopathy   Respiratory:  Clear to auscultation and breath sounds equal. No respiratory distress  CV:  Regular rate and rhythm  GI:  Abdomen soft, nontender, No firm or pulsatile mass, No guarding or rigidity   Back:  No costovertebral tenderness. No midline tenderness   Extremities: No tenderness or edema noted. Integument:  Normal turgor. Warm, dry, without visible rash, unless noted elsewhere. Neurological: GCS 15. CNII-XII grossly intact. Motor functions intact. Lab / Imaging Results   (All laboratory and radiology results have been personally reviewed by myself)  Labs:  No results found for this visit on 09/30/22. Imaging: All Radiology results interpreted by Radiologist unless otherwise noted. CT HEAD WO CONTRAST   Final Result   No acute intracranial abnormality. ED Course / Medical Decision Making   Medications - No data to display    Consult(s):  None    Procedure(s):  None    MDM:      Counseling: The emergency provider has spoken with the patient/caregiver and discussed todays results, in addition to providing specific details for the plan of care and counseling regarding the diagnosis and prognosis. Questions are answered at this time and they are agreeable with the plan. All results reviewed with pt and all questions answered. Patient understands he must follow-up with PCP. They were advised to return to ED if symptoms worsen or if new symptoms develop. Pt remained nontoxic, afebrile, and A&O x4 during this ED visit.  They agreed with plan of care, discharge, and importance of follow-up. Pt was in no distress at discharge. Vitals stable. They were educated on newly prescribed medications. Patient was able to ambulate out of department with no difficulty    Assessment      1. Injury of head, initial encounter      Plan   Discharge to home  Patient condition is good    New Medications     Discharge Medication List as of 9/30/2022  2:43 PM          Electronically signed by Saskia Medina PA-C   DD: 9/30/22  **This report was transcribed using voice recognition software. Every effort was made to ensure accuracy; however, inadvertent computerized transcription errors may be present.     END OF ED PROVIDER NOTE      Saskia Medina PA-C  09/30/22 [id number]

## 2022-11-18 ENCOUNTER — TELEPHONE (OUTPATIENT)
Dept: ADMINISTRATIVE | Age: 52
End: 2022-11-18

## 2022-11-18 NOTE — TELEPHONE ENCOUNTER
Patient believes he may have fractured his right foot. States he feels a sharp pain when he bends his foot.  Please advise scheduling

## 2022-11-18 NOTE — TELEPHONE ENCOUNTER
Advised pt to get in cam walker if he still has one  We can see him on Monday in [place]   But if he needs seen today I Advised ortho walk in in [place] waiting for him to call back in   Left him one mess

## 2022-11-21 ENCOUNTER — OFFICE VISIT (OUTPATIENT)
Dept: PODIATRY | Age: 52
End: 2022-11-21
Payer: COMMERCIAL

## 2022-11-21 VITALS
SYSTOLIC BLOOD PRESSURE: 118 MMHG | TEMPERATURE: 97.5 F | BODY MASS INDEX: 31.17 KG/M2 | DIASTOLIC BLOOD PRESSURE: 64 MMHG | WEIGHT: 205 LBS

## 2022-11-21 DIAGNOSIS — M19.079 ARTHRITIS OF FOOT: ICD-10-CM

## 2022-11-21 DIAGNOSIS — M79.674 PAIN IN TOE OF RIGHT FOOT: ICD-10-CM

## 2022-11-21 DIAGNOSIS — M79.671 PAIN IN RIGHT FOOT: Primary | ICD-10-CM

## 2022-11-21 PROCEDURE — 3074F SYST BP LT 130 MM HG: CPT | Performed by: PODIATRIST

## 2022-11-21 PROCEDURE — 20605 DRAIN/INJ JOINT/BURSA W/O US: CPT | Performed by: PODIATRIST

## 2022-11-21 PROCEDURE — 99213 OFFICE O/P EST LOW 20 MIN: CPT | Performed by: PODIATRIST

## 2022-11-21 PROCEDURE — 3078F DIAST BP <80 MM HG: CPT | Performed by: PODIATRIST

## 2022-11-21 RX ORDER — METHYLPREDNISOLONE ACETATE 40 MG/ML
40 INJECTION, SUSPENSION INTRA-ARTICULAR; INTRALESIONAL; INTRAMUSCULAR; SOFT TISSUE ONCE
Status: COMPLETED | OUTPATIENT
Start: 2022-11-21 | End: 2022-11-21

## 2022-11-21 RX ORDER — BUPIVACAINE HYDROCHLORIDE 5 MG/ML
1 INJECTION, SOLUTION PERINEURAL ONCE
Status: COMPLETED | OUTPATIENT
Start: 2022-11-21 | End: 2022-11-21

## 2022-11-21 RX ADMIN — BUPIVACAINE HYDROCHLORIDE 5 MG: 5 INJECTION, SOLUTION PERINEURAL at 10:56

## 2022-11-21 RX ADMIN — METHYLPREDNISOLONE ACETATE 40 MG: 40 INJECTION, SUSPENSION INTRA-ARTICULAR; INTRALESIONAL; INTRAMUSCULAR; SOFT TISSUE at 10:57

## 2022-11-21 NOTE — PROGRESS NOTES
22  Kel Garcia : 1970 Sex: male  Age: 47 y.o. Patient was referred by Joesph Maldonado MD    Chief Complaint   Patient presents with    Foot Pain     New issue right top of foot pain across the toes no trauma noted        SUBJECTIVE this patient is seen today for a new issue right foot pain. Patient relates that it started about a month to 6 weeks ago no trauma noted patient does stand a lot.   Foot Pain     Review of Systems  Const: Denies constitutional symptoms  Musculo: Denies symptoms other than stated above  Skin: Denies symptoms other than stated above       Current Outpatient Medications:     meloxicam (MOBIC) 15 MG tablet, Take 1 tablet by mouth daily, Disp: 90 tablet, Rfl: 1    empagliflozin (JARDIANCE) 10 MG tablet, Take 10 mg by mouth in the morning., Disp: , Rfl:     albuterol sulfate HFA (VENTOLIN HFA) 108 (90 Base) MCG/ACT inhaler, Inhale 2 puffs into the lungs 4 times daily as needed for Wheezing, Disp: 3 Inhaler, Rfl: 1    meclizine (ANTIVERT) 12.5 MG tablet, Take 1 tablet by mouth 3 times daily as needed for Dizziness, Disp: 20 tablet, Rfl: 0    cetirizine (ZYRTEC) 10 MG tablet, Take 1 tablet by mouth daily, Disp: 30 tablet, Rfl: 1    fluticasone (FLONASE ALLERGY RELIEF) 50 MCG/ACT nasal spray, 1 spray by Each Nostril route daily, Disp: 1 Bottle, Rfl: 1    Cyanocobalamin (B-12) 5000 MCG CAPS, Take 1 capsule by mouth daily, Disp: , Rfl:     BRILINTA 90 MG TABS tablet, Take 90 mg by mouth 2 times daily , Disp: , Rfl:     pantoprazole (PROTONIX) 40 MG tablet, Take 40 mg by mouth daily , Disp: , Rfl:     lisinopril (PRINIVIL;ZESTRIL) 5 MG tablet, Take 5 mg by mouth daily, Disp: , Rfl:     ONETOUCH DELICA LANCETS 62Y MISC, , Disp: , Rfl:     ONE TOUCH ULTRA TEST strip, , Disp: , Rfl:     carvedilol (COREG) 3.125 MG tablet, Take 3.125 mg by mouth 2 times daily (with meals) , Disp: , Rfl:     atorvastatin (LIPITOR) 80 MG tablet, , Disp: , Rfl:     aspirin 81 MG EC tablet, 81 mg in the morning., Disp: , Rfl:     montelukast (SINGULAIR) 10 MG tablet, Take 10 mg by mouth nightly, Disp: , Rfl:   Allergies   Allergen Reactions    Lisinopril Other (See Comments)     Hypotension/Syncope    Atorvastatin Other (See Comments)    Codeine     Morphine     Rosuvastatin      Other reaction(s): Myalgia    Sulfa Antibiotics        Past Medical History:   Diagnosis Date    Chronic sinus complaints     Diverticulitis     Hyperlipidemia     Kidney stone      Past Surgical History:   Procedure Laterality Date    BACK SURGERY      HERNIA REPAIR       Family History   Problem Relation Age of Onset    Heart Attack Father      Social History     Socioeconomic History    Marital status:      Spouse name: Not on file    Number of children: Not on file    Years of education: Not on file    Highest education level: Not on file   Occupational History    Not on file   Tobacco Use    Smoking status: Former     Packs/day: 2.00     Years: 18.00     Pack years: 36.00     Types: Cigarettes     Start date: 3/20/1987     Quit date: 1/1/2019     Years since quitting: 3.8    Smokeless tobacco: Never   Vaping Use    Vaping Use: Never used   Substance and Sexual Activity    Alcohol use: Yes     Comment: Social. No coffee.  Does drink 3 120z mountain dew a day    Drug use: No    Sexual activity: Yes     Partners: Female   Other Topics Concern    Not on file   Social History Narrative    Not on file     Social Determinants of Health     Financial Resource Strain: Not on file   Food Insecurity: Not on file   Transportation Needs: Not on file   Physical Activity: Not on file   Stress: Not on file   Social Connections: Not on file   Intimate Partner Violence: Not on file   Housing Stability: Not on file       Vitals:    11/21/22 0958   BP: 118/64   Temp: 97.5 °F (36.4 °C)   TempSrc: Temporal   Weight: 205 lb (93 kg)       Focused Lower Extremity Physical Exam:  Vitals:    11/21/22 0958   BP: 118/64   Temp: 97.5 °F (36.4 °C) Foot Exam    General  General Appearance: appears stated age and healthy   Orientation: alert and oriented to person, place, and time       Right Foot/Ankle     Inspection and Palpation  Tenderness: bony tenderness and metatarsals   Swelling: none   Arch: normal  Hammertoes: absent  Claw Toes: absent  Skin Exam: skin intact; Neurovascular  Dorsalis pedis: 3+  Posterior tibial: 3+  Saphenous nerve sensation: normal  Tibial nerve sensation: normal  Superficial peroneal nerve sensation: normal  Deep peroneal nerve sensation: normal  Sural nerve sensation: normal  Achilles reflex: 2+  Babinski reflex: 2+    Muscle Strength  Ankle dorsiflexion: 5  Ankle plantar flexion: 5  Ankle inversion: 5  Ankle eversion: 5  Great toe extension: 5  Great toe flexion: 5    Range of Motion    Passive  Ankle inversion: pain             Right Ankle Exam     Muscle Strength   Dorsiflexion:  5/5  Plantar flexion:  5/5           Vascular: pulses  dp  pt palpable  Capillary Refill Time:   Hair growth  Skin:    Edema:    Neurologic:      Musculoskeletal/ Orthopedic examination: Pain with palpation to the dorsal lateral aspect of the right foot pain with inversion negative pain with eversion. New x-rays were taken showing no fractures dislocations mild arthritis noted  NAIL  Web space   Derm:         Assessment and Plan: Right foot dorsal lateral midfoot injection:  Potential risks, complications, alternative treatment options and procedure prognosis were explained to the patient. Verbal and signed informed consent were obtained from the patient. An antiseptic preparation of the skin was performed with ChloraPrep 1 cc of Depo-Medrol 1 cc of Marcaine plain into the midtarsal joint no adverse reaction was observed. The puncture site was covered with an adhesive bandage and the patient was fitted with a removable metatarsal pad. Post injection instructions were given. Jessika Saldivar was seen today for foot pain.     Diagnoses and all orders for this visit:    Pain in right foot  -     XR FOOT RIGHT (MIN 3 VIEWS); Future    Pain in toe of right foot    Arthritis of foot    Other orders  -     bupivacaine (MARCAINE) 0.5 % injection 5 mg  -     methylPREDNISolone acetate (DEPO-MEDROL) injection 40 mg      No follow-ups on file. Seen By:  Bernadette Kemp DPM      Document was created using voice recognition software. Note was reviewed, however may contain grammatical errors.

## 2022-12-19 ENCOUNTER — OFFICE VISIT (OUTPATIENT)
Dept: PODIATRY | Age: 52
End: 2022-12-19
Payer: COMMERCIAL

## 2022-12-19 VITALS
BODY MASS INDEX: 31.17 KG/M2 | WEIGHT: 205 LBS | DIASTOLIC BLOOD PRESSURE: 79 MMHG | TEMPERATURE: 98.2 F | SYSTOLIC BLOOD PRESSURE: 132 MMHG

## 2022-12-19 DIAGNOSIS — M79.674 PAIN IN TOE OF RIGHT FOOT: ICD-10-CM

## 2022-12-19 DIAGNOSIS — M79.671 PAIN IN RIGHT FOOT: Primary | ICD-10-CM

## 2022-12-19 DIAGNOSIS — M19.079 ARTHRITIS OF FOOT: ICD-10-CM

## 2022-12-19 PROCEDURE — 3078F DIAST BP <80 MM HG: CPT | Performed by: PODIATRIST

## 2022-12-19 PROCEDURE — 99213 OFFICE O/P EST LOW 20 MIN: CPT | Performed by: PODIATRIST

## 2022-12-19 PROCEDURE — 3074F SYST BP LT 130 MM HG: CPT | Performed by: PODIATRIST

## 2022-12-19 RX ORDER — MELOXICAM 15 MG/1
15 TABLET ORAL DAILY
Qty: 90 TABLET | Refills: 1 | Status: SHIPPED | OUTPATIENT
Start: 2022-12-19 | End: 2023-03-19

## 2022-12-19 NOTE — PROGRESS NOTES
22  Valeri Schultz : 1970 Sex: male  Age: 47 y.o. Patient was referred by Shane Herndon MD    Chief Complaint   Patient presents with    Foot Pain     Right foot had injection last visit doing much better        SUBJECTIVE patient is seen today for follow-up of right foot injection and pain. Patient states that the meloxicam and injection have not really improved the pain.   Foot Pain     Review of Systems  Const: Denies constitutional symptoms  Musculo: Denies symptoms other than stated above  Skin: Denies symptoms other than stated above       Current Outpatient Medications:     meloxicam (MOBIC) 15 MG tablet, Take 1 tablet by mouth daily, Disp: 90 tablet, Rfl: 1    diclofenac sodium (VOLTAREN) 1 % GEL, Apply 4 g topically 4 times daily, Disp: 350 g, Rfl: 3    empagliflozin (JARDIANCE) 10 MG tablet, Take 10 mg by mouth in the morning., Disp: , Rfl:     albuterol sulfate HFA (VENTOLIN HFA) 108 (90 Base) MCG/ACT inhaler, Inhale 2 puffs into the lungs 4 times daily as needed for Wheezing, Disp: 3 Inhaler, Rfl: 1    meclizine (ANTIVERT) 12.5 MG tablet, Take 1 tablet by mouth 3 times daily as needed for Dizziness, Disp: 20 tablet, Rfl: 0    cetirizine (ZYRTEC) 10 MG tablet, Take 1 tablet by mouth daily, Disp: 30 tablet, Rfl: 1    fluticasone (FLONASE ALLERGY RELIEF) 50 MCG/ACT nasal spray, 1 spray by Each Nostril route daily, Disp: 1 Bottle, Rfl: 1    Cyanocobalamin (B-12) 5000 MCG CAPS, Take 1 capsule by mouth daily, Disp: , Rfl:     BRILINTA 90 MG TABS tablet, Take 90 mg by mouth 2 times daily , Disp: , Rfl:     pantoprazole (PROTONIX) 40 MG tablet, Take 40 mg by mouth daily , Disp: , Rfl:     lisinopril (PRINIVIL;ZESTRIL) 5 MG tablet, Take 5 mg by mouth daily, Disp: , Rfl:     ONETOUCH DELICA LANCETS 98F MISC, , Disp: , Rfl:     ONE TOUCH ULTRA TEST strip, , Disp: , Rfl:     carvedilol (COREG) 3.125 MG tablet, Take 3.125 mg by mouth 2 times daily (with meals) , Disp: , Rfl:     atorvastatin (LIPITOR) 80 MG tablet, , Disp: , Rfl:     aspirin 81 MG EC tablet, 81 mg  in the morning., Disp: , Rfl:     montelukast (SINGULAIR) 10 MG tablet, Take 10 mg by mouth nightly, Disp: , Rfl:   Allergies   Allergen Reactions    Lisinopril Other (See Comments)     Hypotension/Syncope    Atorvastatin Other (See Comments)    Codeine     Morphine     Rosuvastatin      Other reaction(s): Myalgia    Sulfa Antibiotics        Past Medical History:   Diagnosis Date    Chronic sinus complaints     Diverticulitis     Hyperlipidemia     Kidney stone      Past Surgical History:   Procedure Laterality Date    BACK SURGERY      HERNIA REPAIR       Family History   Problem Relation Age of Onset    Heart Attack Father      Social History     Socioeconomic History    Marital status:      Spouse name: Not on file    Number of children: Not on file    Years of education: Not on file    Highest education level: Not on file   Occupational History    Not on file   Tobacco Use    Smoking status: Former     Packs/day: 2.00     Years: 18.00     Pack years: 36.00     Types: Cigarettes     Start date: 3/20/1987     Quit date: 1/1/2019     Years since quitting: 3.9    Smokeless tobacco: Never   Vaping Use    Vaping Use: Never used   Substance and Sexual Activity    Alcohol use: Yes     Comment: Social. No coffee.  Does drink 3 120z mountain dew a day    Drug use: No    Sexual activity: Yes     Partners: Female   Other Topics Concern    Not on file   Social History Narrative    Not on file     Social Determinants of Health     Financial Resource Strain: Not on file   Food Insecurity: Not on file   Transportation Needs: Not on file   Physical Activity: Not on file   Stress: Not on file   Social Connections: Not on file   Intimate Partner Violence: Not on file   Housing Stability: Not on file       Vitals:    12/19/22 0819   BP: 132/79   Temp: 98.2 °F (36.8 °C)   TempSrc: Temporal   Weight: 205 lb (93 kg)       Focused Lower Extremity Physical Exam:  Vitals:    12/19/22 0819   BP: 132/79   Temp: 98.2 °F (36.8 °C)        Foot Exam    General  General Appearance: appears stated age and healthy   Orientation: alert and oriented to person, place, and time       Right Foot/Ankle     Inspection and Palpation  Tenderness: none   Swelling: none   Arch: normal  Hammertoes: absent  Claw Toes: absent  Skin Exam: skin intact; Neurovascular  Dorsalis pedis: 3+  Posterior tibial: 3+  Saphenous nerve sensation: normal  Tibial nerve sensation: normal  Superficial peroneal nerve sensation: normal  Deep peroneal nerve sensation: normal  Sural nerve sensation: normal  Achilles reflex: 2+  Babinski reflex: 2+    Muscle Strength  Ankle dorsiflexion: 5  Ankle plantar flexion: 5  Ankle inversion: 5  Ankle eversion: 5  Great toe extension: 5  Great toe flexion: 5    Range of Motion    Normal right ankle ROM           Right Ankle Exam     Range of Motion   The patient has normal right ankle ROM. Muscle Strength   Dorsiflexion:  5/5  Plantar flexion:  5/5           Vascular: pulses  dp  pt palpable  Capillary Refill Time:   Hair growth  Skin:    Edema:    Neurologic:      Musculoskeletal/ Orthopedic examination:    NAIL   Web space  Derm:          Assessment and Plan: Patient was placed on meloxicam continue once a day with Voltaren gel. Jeannie Mendoza was seen today for foot pain. Diagnoses and all orders for this visit:    Pain in right foot    Pain in toe of right foot    Arthritis of foot    Other orders  -     meloxicam (MOBIC) 15 MG tablet; Take 1 tablet by mouth daily  -     diclofenac sodium (VOLTAREN) 1 % GEL; Apply 4 g topically 4 times daily      No follow-ups on file. Seen By:  Ana Quintero DPM      Document was created using voice recognition software. Note was reviewed, however may contain grammatical errors.

## 2023-01-19 ENCOUNTER — APPOINTMENT (OUTPATIENT)
Dept: GENERAL RADIOLOGY | Age: 53
End: 2023-01-19
Payer: COMMERCIAL

## 2023-01-19 ENCOUNTER — HOSPITAL ENCOUNTER (EMERGENCY)
Age: 53
Discharge: HOME | End: 2023-01-19
Attending: EMERGENCY MEDICINE
Payer: COMMERCIAL

## 2023-01-19 VITALS
HEIGHT: 68 IN | TEMPERATURE: 97.7 F | WEIGHT: 200 LBS | BODY MASS INDEX: 30.31 KG/M2 | HEART RATE: 57 BPM | SYSTOLIC BLOOD PRESSURE: 103 MMHG | DIASTOLIC BLOOD PRESSURE: 62 MMHG | OXYGEN SATURATION: 97 % | RESPIRATION RATE: 18 BRPM

## 2023-01-19 DIAGNOSIS — S46.211A RUPTURE OF RIGHT BICEPS TENDON, INITIAL ENCOUNTER: Primary | ICD-10-CM

## 2023-01-19 LAB
ALBUMIN SERPL-MCNC: 3.9 G/DL (ref 3.5–5.2)
ALP BLD-CCNC: 87 U/L (ref 40–129)
ALT SERPL-CCNC: 24 U/L (ref 0–40)
ANION GAP SERPL CALCULATED.3IONS-SCNC: 10 MMOL/L (ref 7–16)
AST SERPL-CCNC: 16 U/L (ref 0–39)
BASOPHILS ABSOLUTE: 0.09 E9/L (ref 0–0.2)
BASOPHILS RELATIVE PERCENT: 0.8 % (ref 0–2)
BILIRUB SERPL-MCNC: 0.7 MG/DL (ref 0–1.2)
BUN BLDV-MCNC: 18 MG/DL (ref 6–20)
CALCIUM SERPL-MCNC: 8.6 MG/DL (ref 8.6–10.2)
CHLORIDE BLD-SCNC: 103 MMOL/L (ref 98–107)
CO2: 25 MMOL/L (ref 22–29)
CREAT SERPL-MCNC: 0.9 MG/DL (ref 0.7–1.2)
EKG ATRIAL RATE: 68 BPM
EKG P AXIS: 34 DEGREES
EKG P-R INTERVAL: 134 MS
EKG Q-T INTERVAL: 402 MS
EKG QRS DURATION: 136 MS
EKG QTC CALCULATION (BAZETT): 427 MS
EKG R AXIS: -32 DEGREES
EKG T AXIS: 25 DEGREES
EKG VENTRICULAR RATE: 68 BPM
EOSINOPHILS ABSOLUTE: 0.06 E9/L (ref 0.05–0.5)
EOSINOPHILS RELATIVE PERCENT: 0.5 % (ref 0–6)
GFR SERPL CREATININE-BSD FRML MDRD: >60 ML/MIN/1.73
GLUCOSE BLD-MCNC: 196 MG/DL (ref 74–99)
HCT VFR BLD CALC: 40.2 % (ref 37–54)
HEMOGLOBIN: 13.5 G/DL (ref 12.5–16.5)
IMMATURE GRANULOCYTES #: 0.07 E9/L
IMMATURE GRANULOCYTES %: 0.6 % (ref 0–5)
LYMPHOCYTES ABSOLUTE: 1.96 E9/L (ref 1.5–4)
LYMPHOCYTES RELATIVE PERCENT: 17.5 % (ref 20–42)
MCH RBC QN AUTO: 31.1 PG (ref 26–35)
MCHC RBC AUTO-ENTMCNC: 33.6 % (ref 32–34.5)
MCV RBC AUTO: 92.6 FL (ref 80–99.9)
MONOCYTES ABSOLUTE: 0.69 E9/L (ref 0.1–0.95)
MONOCYTES RELATIVE PERCENT: 6.2 % (ref 2–12)
NEUTROPHILS ABSOLUTE: 8.3 E9/L (ref 1.8–7.3)
NEUTROPHILS RELATIVE PERCENT: 74.4 % (ref 43–80)
PDW BLD-RTO: 13.1 FL (ref 11.5–15)
PLATELET # BLD: 184 E9/L (ref 130–450)
PMV BLD AUTO: 9.8 FL (ref 7–12)
POTASSIUM REFLEX MAGNESIUM: 3.7 MMOL/L (ref 3.5–5)
RBC # BLD: 4.34 E12/L (ref 3.8–5.8)
SODIUM BLD-SCNC: 138 MMOL/L (ref 132–146)
TOTAL PROTEIN: 6.3 G/DL (ref 6.4–8.3)
TROPONIN, HIGH SENSITIVITY: <6 NG/L (ref 0–11)
WBC # BLD: 11.2 E9/L (ref 4.5–11.5)

## 2023-01-19 PROCEDURE — 99285 EMERGENCY DEPT VISIT HI MDM: CPT

## 2023-01-19 PROCEDURE — 80053 COMPREHEN METABOLIC PANEL: CPT

## 2023-01-19 PROCEDURE — 73030 X-RAY EXAM OF SHOULDER: CPT

## 2023-01-19 PROCEDURE — 93005 ELECTROCARDIOGRAM TRACING: CPT | Performed by: NURSE PRACTITIONER

## 2023-01-19 PROCEDURE — 73070 X-RAY EXAM OF ELBOW: CPT

## 2023-01-19 PROCEDURE — 84484 ASSAY OF TROPONIN QUANT: CPT

## 2023-01-19 PROCEDURE — 85025 COMPLETE CBC W/AUTO DIFF WBC: CPT

## 2023-01-19 PROCEDURE — 71046 X-RAY EXAM CHEST 2 VIEWS: CPT

## 2023-01-19 PROCEDURE — 93010 ELECTROCARDIOGRAM REPORT: CPT | Performed by: INTERNAL MEDICINE

## 2023-01-19 RX ORDER — HYDROCODONE BITARTRATE AND ACETAMINOPHEN 5; 325 MG/1; MG/1
2 TABLET ORAL ONCE
Status: DISCONTINUED | OUTPATIENT
Start: 2023-01-19 | End: 2023-01-19 | Stop reason: HOSPADM

## 2023-01-19 RX ORDER — HYDROCODONE BITARTRATE AND ACETAMINOPHEN 5; 325 MG/1; MG/1
1-2 TABLET ORAL EVERY 6 HOURS PRN
Qty: 10 TABLET | Refills: 0 | Status: SHIPPED | OUTPATIENT
Start: 2023-01-19 | End: 2023-01-22

## 2023-01-19 ASSESSMENT — PAIN - FUNCTIONAL ASSESSMENT: PAIN_FUNCTIONAL_ASSESSMENT: 0-10

## 2023-01-19 NOTE — ED NOTES
Department of Emergency Medicine  FIRST PROVIDER TRIAGE NOTE             [facility]           1/19/23  11:52 AM EST    Date of Encounter: 1/19/23   MRN: [id number]      HPI: Breanne Richardson is a 47 y.o. male who presents to the ED for Chest Pain (yesterday), Shortness of Breath, and Arm Injury (Rt forearm pain and swelling since mechanical fall down 3 steps this am)   Chest pain for 5 hours yesterday, took nitro. MI hx x2  Falling because ankle gave out. Denies ankle pain. Injured right elbow and shoulder. ROS: Negative for abd pain or back pain. PE: Gen Appearance/Constitutional: alert     Initial Plan of Care: All treatment areas with department are currently occupied. Plan to order/Initiate the following while awaiting opening in ED: labs, EKG, and imaging studies.   Initiate Treatment-Testing, Proceed toTreatment Area When Bed Available for ED Attending/MLP to Continue Care    Electronically signed by DAPHNE Durand CNP   DD: 1/19/23       DAPHNE Durand CNP  01/19/23 [id number]

## 2023-01-19 NOTE — ED PROVIDER NOTES
HPI:  1/19/23,   Time: 1:31 PM NACHO Haque is a 47 y.o. male presenting to the ED for ration of right arm pain. He states he fell at approximate 1030 this morning feels he may have torn his bicep. He has pain in the right distal bicep region worse with movement. He also complained of an intermittent small episode of chest pain yesterday that is now resolved. Denies fevers or chills. Denies shortness of breath. Denies numbness or weakness in his extremities. ROS:   Pertinent positives and negatives are stated within HPI, all other systems reviewed and are negative.  --------------------------------------------- PAST HISTORY ---------------------------------------------  Past Medical History:  has a past medical history of Chronic sinus complaints, Diverticulitis, Hyperlipidemia, and Kidney stone. Past Surgical History:  has a past surgical history that includes hernia repair and back surgery. Social History:  reports that he quit smoking about 4 years ago. His smoking use included cigarettes. He started smoking about 35 years ago. He has a 36.00 pack-year smoking history. He has never used smokeless tobacco. He reports current alcohol use. He reports that he does not use drugs. Family History: family history includes Heart Attack in his father. The patients home medications have been reviewed.     Allergies: Lisinopril, Atorvastatin, Codeine, Morphine, Rosuvastatin, and Sulfa antibiotics    -------------------------------------------------- RESULTS -------------------------------------------------  All laboratory and radiology results have been personally reviewed by myself   LABS:  Results for orders placed or performed during the hospital encounter of 01/19/23   Troponin   Result Value Ref Range    Troponin, High Sensitivity <6 0 - 11 ng/L   CBC with Auto Differential   Result Value Ref Range    WBC 11.2 4.5 - 11.5 E9/L    RBC 4.34 3.80 - 5.80 E12/L    Hemoglobin 13.5 12.5 - 16.5 g/dL    Hematocrit 40.2 37.0 - 54.0 %    MCV 92.6 80.0 - 99.9 fL    MCH 31.1 26.0 - 35.0 pg    MCHC 33.6 32.0 - 34.5 %    RDW 13.1 11.5 - 15.0 fL    Platelets 797 719 - 854 E9/L    MPV 9.8 7.0 - 12.0 fL    Neutrophils % 74.4 43.0 - 80.0 %    Immature Granulocytes % 0.6 0.0 - 5.0 %    Lymphocytes % 17.5 (L) 20.0 - 42.0 %    Monocytes % 6.2 2.0 - 12.0 %    Eosinophils % 0.5 0.0 - 6.0 %    Basophils % 0.8 0.0 - 2.0 %    Neutrophils Absolute 8.30 (H) 1.80 - 7.30 E9/L    Immature Granulocytes # 0.07 E9/L    Lymphocytes Absolute 1.96 1.50 - 4.00 E9/L    Monocytes Absolute 0.69 0.10 - 0.95 E9/L    Eosinophils Absolute 0.06 0.05 - 0.50 E9/L    Basophils Absolute 0.09 0.00 - 0.20 E9/L   Comprehensive Metabolic Panel w/ Reflex to MG   Result Value Ref Range    Sodium 138 132 - 146 mmol/L    Potassium reflex Magnesium 3.7 3.5 - 5.0 mmol/L    Chloride 103 98 - 107 mmol/L    CO2 25 22 - 29 mmol/L    Anion Gap 10 7 - 16 mmol/L    Glucose 196 (H) 74 - 99 mg/dL    BUN 18 6 - 20 mg/dL    Creatinine 0.9 0.7 - 1.2 mg/dL    Est, Glom Filt Rate >60 >=60 mL/min/1.73    Calcium 8.6 8.6 - 10.2 mg/dL    Total Protein 6.3 (L) 6.4 - 8.3 g/dL    Albumin 3.9 3.5 - 5.2 g/dL    Total Bilirubin 0.7 0.0 - 1.2 mg/dL    Alkaline Phosphatase 87 40 - 129 U/L    ALT 24 0 - 40 U/L    AST 16 0 - 39 U/L   EKG 12 Lead   Result Value Ref Range    Ventricular Rate 68 BPM    Atrial Rate 68 BPM    P-R Interval 134 ms    QRS Duration 136 ms    Q-T Interval 402 ms    QTc Calculation (Bazett) 427 ms    P Axis 34 degrees    R Axis -32 degrees    T Axis 25 degrees       RADIOLOGY:  Interpreted by Radiologist.  XR CHEST (2 VW)   Final Result   No acute process. XR ELBOW RIGHT (2 VIEWS)   Final Result   No acute abnormality.          XR SHOULDER RIGHT (MIN 2 VIEWS)   Final Result   No acute abnormality.             ------------------------- NURSING NOTES AND VITALS REVIEWED ---------------------------   The nursing notes within the ED encounter and vital signs as below have been reviewed. BP 99/60   Pulse 65   Temp 97.7 °F (36.5 °C)   Resp 16   Ht 5' 8\" (1.727 m)   Wt 200 lb (90.7 kg)   SpO2 97%   BMI 30.41 kg/m²   Oxygen Saturation Interpretation: Normal      ---------------------------------------------------PHYSICAL EXAM--------------------------------------      Constitutional/General: Alert and oriented x3, well appearing, non toxic in NAD  Head: NC/AT  Eyes: PERRL, EOMI  Nose:  Nares patent. No congestion or discharge noted. Ears:  TM's intact without erythema or perforation. External canal without swelling  Mouth: Oropharynx clear, handling secretions, no trismus  Neck: Supple, full ROM, no meningeal signs  Pulmonary: Lungs Clear to auscultation bilaterally. No rales or rhonchi or wheezes noted. No retractions. Cardiovascular:  Regular rate and rhythm, no murmurs, gallops, or rubs. 2+ symmetric distal pulses   Chest:  No tenderness, deformity or crepitus  Abdomen: Soft, non tender, non distended, normal bowel sounds  Back:  No tenderness to palpation on the cervical or thoracic or lumbar spine  Extremities: Moves all extremities x 4. Warm and well perfused. Tenderness and soft tissue deformity noted in the proximal medial bicep region. He does have full range of motion bicep that is asymmetric as compared to the left bicep. Distal neurovascular is intact  Skin: warm and dry without rash  Neurologic: GCS 15, no focal acute neurological deficit  Psych: Normal Affect      ------------------------------ ED COURSE/MEDICAL DECISION MAKING----------------------  Medications - No data to display         Medical Decision Making:    I believe the patient has a proximal biceps tendon tear. He will be discharged home. He will be placed in compression. Given orthopedic follow-up. Advised on the benefits of ice and rest to treat his injury. Instructed to return should his symptoms worsen.   Patient advised to return to the emergency department should symptoms worsen. Advised to contact primary care physician to secure follow-up appointment within the next 1-2 days. --------------------------------- IMPRESSION AND DISPOSITION ---------------------------------    IMPRESSION  1.  Rupture of right biceps tendon, initial encounter        DISPOSITION  Disposition: Discharge to home  Patient condition is good        Aubery Cheadle, DO  01/19/23 6553

## 2023-02-03 NOTE — PROGRESS NOTES
[facility] PRE-ADMISSION TESTING INSTRUCTIONS    The Preadmission Testing patient is instructed accordingly using the following criteria (check applicable):    ARRIVAL INSTRUCTIONS:  [x] Parking the day of Surgery is located in the Main Entrance lot. Upon entering the door, make an immediate right to the surgery reception desk    [x] Bring photo ID and insurance card    [] Bring in a copy of Living will or Durable Power of  papers. [x] Please be sure to arrange for responsible adult to provide transportation to and from the hospital    [x] Please arrange for responsible adult to be with you for the 24 hour period post procedure due to having anesthesia    [x] If you awake am of surgery not feeling well or have temperature >100 please call [phone]    GENERAL INSTRUCTIONS:    [x] Nothing by mouth after midnight, including gum, candy, mints or water    [x] You may brush your teeth, but do not swallow any water    [x] Take medications as instructed with 1-2 oz of water    [x] Stop herbal supplements and vitamins 5 days prior to procedure    [x] Follow preop dosing of blood thinners per physician instructions    [] Take 1/2 dose of evening insulin, but no insulin after midnight    [x] No oral diabetic medications after midnight    [x] If diabetic and have low blood sugar or feel symptomatic, take 1-2oz apple juice only    [] Bring inhalers day of surgery    [] Bring C-PAP/ Bi-Pap day of surgery    [] Bring urine specimen day of surgery    [x] Shower or bath with soap, lather and rinse well, AM of Surgery, no lotion, powders or creams to surgical site    [] Follow bowel prep as instructed per surgeon    [x] No tobacco products within 24 hours of surgery     [x] No alcohol or illegal drug use within 24 hours of surgery.     [x] Jewelry, body piercing's, eyeglasses, contact lenses and dentures are not permitted into surgery (bring cases)      [] Please do not wear any nail polish, make up or hair products on the day of surgery    [x] You can expect a call the business day prior to procedure to notify you if your arrival time changes    [x] If you receive a survey after surgery we would greatly appreciate your comments    [] Parent/guardian of a minor must accompany their child and remain on the premises  the entire time they are under our care     [] Pediatric patients may bring favorite toy, blanket or comfort item with them    [] A caregiver or family member must remain with the patient during their stay if they are mentally handicapped, have dementia, disoriented or unable to use a call light or would be a safety concern if left unattended    [x] Please notify surgeon if you develop any illness between now and time of surgery (cold, cough, sore throat, fever, nausea, vomiting) or any signs of infections  including skin, wounds, and dental.    [x]  The Outpatient Pharmacy is available to fill your prescription here on your day of surgery, ask your preop nurse for details    [] Other instructions    EDUCATIONAL MATERIALS PROVIDED:    [] PAT Preoperative Education Packet/Booklet     [] Medication List    [] Transfusion bracelet applied with instructions    [] Shower with soap, lather and rinse well, and use CHG wipes provided the evening before surgery as instructed    [] Incentive spirometer with instructions

## 2023-02-06 ENCOUNTER — ANESTHESIA EVENT (OUTPATIENT)
Dept: OPERATING ROOM | Age: 53
End: 2023-02-06
Payer: COMMERCIAL

## 2023-02-07 ENCOUNTER — ANESTHESIA (OUTPATIENT)
Dept: OPERATING ROOM | Age: 53
End: 2023-02-07
Payer: COMMERCIAL

## 2023-02-07 ENCOUNTER — HOSPITAL ENCOUNTER (OUTPATIENT)
Age: 53
Setting detail: OUTPATIENT SURGERY
Discharge: HOME | End: 2023-02-07
Attending: GENERAL ACUTE CARE HOSPITAL | Admitting: GENERAL ACUTE CARE HOSPITAL
Payer: COMMERCIAL

## 2023-02-07 VITALS
HEART RATE: 78 BPM | OXYGEN SATURATION: 92 % | WEIGHT: 200 LBS | HEIGHT: 68 IN | TEMPERATURE: 98 F | SYSTOLIC BLOOD PRESSURE: 144 MMHG | DIASTOLIC BLOOD PRESSURE: 78 MMHG | RESPIRATION RATE: 18 BRPM | BODY MASS INDEX: 30.31 KG/M2

## 2023-02-07 DIAGNOSIS — S46.211A RUPTURE OF RIGHT DISTAL BICEPS TENDON, INITIAL ENCOUNTER: Primary | ICD-10-CM

## 2023-02-07 LAB
ANION GAP SERPL CALCULATED.3IONS-SCNC: 10 MMOL/L (ref 7–16)
BUN BLDV-MCNC: 11 MG/DL (ref 6–20)
CALCIUM SERPL-MCNC: 8.9 MG/DL (ref 8.6–10.2)
CHLORIDE BLD-SCNC: 105 MMOL/L (ref 98–107)
CO2: 23 MMOL/L (ref 22–29)
CREAT SERPL-MCNC: 0.8 MG/DL (ref 0.7–1.2)
GFR SERPL CREATININE-BSD FRML MDRD: >60 ML/MIN/1.73
GLUCOSE BLD-MCNC: 126 MG/DL (ref 74–99)
HCT VFR BLD CALC: 42.6 % (ref 37–54)
HEMOGLOBIN: 14.5 G/DL (ref 12.5–16.5)
MCH RBC QN AUTO: 30.8 PG (ref 26–35)
MCHC RBC AUTO-ENTMCNC: 34 % (ref 32–34.5)
MCV RBC AUTO: 90.4 FL (ref 80–99.9)
PDW BLD-RTO: 12.9 FL (ref 11.5–15)
PLATELET # BLD: 192 E9/L (ref 130–450)
PMV BLD AUTO: 9.1 FL (ref 7–12)
POTASSIUM SERPL-SCNC: 4.1 MMOL/L (ref 3.5–5)
RBC # BLD: 4.71 E12/L (ref 3.8–5.8)
SODIUM BLD-SCNC: 138 MMOL/L (ref 132–146)
WBC # BLD: 8.4 E9/L (ref 4.5–11.5)

## 2023-02-07 PROCEDURE — 2500000003 HC RX 250 WO HCPCS: Performed by: NURSE ANESTHETIST, CERTIFIED REGISTERED

## 2023-02-07 PROCEDURE — 3600000013 HC SURGERY LEVEL 3 ADDTL 15MIN: Performed by: GENERAL ACUTE CARE HOSPITAL

## 2023-02-07 PROCEDURE — C1713 ANCHOR/SCREW BN/BN,TIS/BN: HCPCS | Performed by: GENERAL ACUTE CARE HOSPITAL

## 2023-02-07 PROCEDURE — 7100000011 HC PHASE II RECOVERY - ADDTL 15 MIN: Performed by: GENERAL ACUTE CARE HOSPITAL

## 2023-02-07 PROCEDURE — 3700000001 HC ADD 15 MINUTES (ANESTHESIA): Performed by: GENERAL ACUTE CARE HOSPITAL

## 2023-02-07 PROCEDURE — 2709999900 HC NON-CHARGEABLE SUPPLY: Performed by: GENERAL ACUTE CARE HOSPITAL

## 2023-02-07 PROCEDURE — 7100000001 HC PACU RECOVERY - ADDTL 15 MIN: Performed by: GENERAL ACUTE CARE HOSPITAL

## 2023-02-07 PROCEDURE — 7100000000 HC PACU RECOVERY - FIRST 15 MIN: Performed by: GENERAL ACUTE CARE HOSPITAL

## 2023-02-07 PROCEDURE — 3700000000 HC ANESTHESIA ATTENDED CARE: Performed by: GENERAL ACUTE CARE HOSPITAL

## 2023-02-07 PROCEDURE — 80048 BASIC METABOLIC PNL TOTAL CA: CPT

## 2023-02-07 PROCEDURE — 36415 COLL VENOUS BLD VENIPUNCTURE: CPT

## 2023-02-07 PROCEDURE — 2580000003 HC RX 258: Performed by: GENERAL ACUTE CARE HOSPITAL

## 2023-02-07 PROCEDURE — 3600000003 HC SURGERY LEVEL 3 BASE: Performed by: GENERAL ACUTE CARE HOSPITAL

## 2023-02-07 PROCEDURE — 6360000002 HC RX W HCPCS: Performed by: NURSE ANESTHETIST, CERTIFIED REGISTERED

## 2023-02-07 PROCEDURE — 6370000000 HC RX 637 (ALT 250 FOR IP)

## 2023-02-07 PROCEDURE — 6360000002 HC RX W HCPCS: Performed by: GENERAL ACUTE CARE HOSPITAL

## 2023-02-07 PROCEDURE — 6360000002 HC RX W HCPCS: Performed by: ANESTHESIOLOGY

## 2023-02-07 PROCEDURE — 7100000010 HC PHASE II RECOVERY - FIRST 15 MIN: Performed by: GENERAL ACUTE CARE HOSPITAL

## 2023-02-07 PROCEDURE — 85027 COMPLETE CBC AUTOMATED: CPT

## 2023-02-07 PROCEDURE — 2500000003 HC RX 250 WO HCPCS: Performed by: GENERAL ACUTE CARE HOSPITAL

## 2023-02-07 RX ORDER — DEXAMETHASONE SODIUM PHOSPHATE 4 MG/ML
INJECTION, SOLUTION INTRA-ARTICULAR; INTRALESIONAL; INTRAMUSCULAR; INTRAVENOUS; SOFT TISSUE PRN
Status: DISCONTINUED | OUTPATIENT
Start: 2023-02-07 | End: 2023-02-07 | Stop reason: SDUPTHER

## 2023-02-07 RX ORDER — OXYCODONE HYDROCHLORIDE 5 MG/1
5 TABLET ORAL EVERY 4 HOURS PRN
Status: CANCELLED | OUTPATIENT
Start: 2023-02-07

## 2023-02-07 RX ORDER — LIDOCAINE HYDROCHLORIDE 20 MG/ML
INJECTION, SOLUTION EPIDURAL; INFILTRATION; INTRACAUDAL; PERINEURAL PRN
Status: DISCONTINUED | OUTPATIENT
Start: 2023-02-07 | End: 2023-02-07 | Stop reason: SDUPTHER

## 2023-02-07 RX ORDER — SODIUM CHLORIDE 0.9 % (FLUSH) 0.9 %
5-40 SYRINGE (ML) INJECTION EVERY 12 HOURS SCHEDULED
Status: DISCONTINUED | OUTPATIENT
Start: 2023-02-07 | End: 2023-02-07 | Stop reason: HOSPADM

## 2023-02-07 RX ORDER — OXYCODONE HYDROCHLORIDE 5 MG/1
10 TABLET ORAL EVERY 4 HOURS PRN
Status: CANCELLED | OUTPATIENT
Start: 2023-02-07

## 2023-02-07 RX ORDER — BUPIVACAINE HYDROCHLORIDE 2.5 MG/ML
INJECTION, SOLUTION EPIDURAL; INFILTRATION; INTRACAUDAL PRN
Status: DISCONTINUED | OUTPATIENT
Start: 2023-02-07 | End: 2023-02-07 | Stop reason: ALTCHOICE

## 2023-02-07 RX ORDER — SODIUM CHLORIDE 0.9 % (FLUSH) 0.9 %
5-40 SYRINGE (ML) INJECTION EVERY 12 HOURS SCHEDULED
Status: CANCELLED | OUTPATIENT
Start: 2023-02-07

## 2023-02-07 RX ORDER — FENTANYL CITRATE 50 UG/ML
INJECTION, SOLUTION INTRAMUSCULAR; INTRAVENOUS PRN
Status: DISCONTINUED | OUTPATIENT
Start: 2023-02-07 | End: 2023-02-07 | Stop reason: SDUPTHER

## 2023-02-07 RX ORDER — INDOMETHACIN 25 MG/1
25 CAPSULE ORAL 3 TIMES DAILY
Qty: 84 CAPSULE | Refills: 0 | Status: SHIPPED | OUTPATIENT
Start: 2023-02-08 | End: 2023-02-07 | Stop reason: SDUPTHER

## 2023-02-07 RX ORDER — ONDANSETRON 2 MG/ML
INJECTION INTRAMUSCULAR; INTRAVENOUS PRN
Status: DISCONTINUED | OUTPATIENT
Start: 2023-02-07 | End: 2023-02-07 | Stop reason: SDUPTHER

## 2023-02-07 RX ORDER — HYDROMORPHONE HCL 110MG/55ML
PATIENT CONTROLLED ANALGESIA SYRINGE INTRAVENOUS PRN
Status: DISCONTINUED | OUTPATIENT
Start: 2023-02-07 | End: 2023-02-07 | Stop reason: SDUPTHER

## 2023-02-07 RX ORDER — ONDANSETRON 4 MG/1
4 TABLET, ORALLY DISINTEGRATING ORAL EVERY 8 HOURS PRN
Status: CANCELLED | OUTPATIENT
Start: 2023-02-07

## 2023-02-07 RX ORDER — ONDANSETRON 2 MG/ML
4 INJECTION INTRAMUSCULAR; INTRAVENOUS EVERY 6 HOURS PRN
Status: CANCELLED | OUTPATIENT
Start: 2023-02-07

## 2023-02-07 RX ORDER — MORPHINE SULFATE 2 MG/ML
2 INJECTION, SOLUTION INTRAMUSCULAR; INTRAVENOUS
Status: CANCELLED | OUTPATIENT
Start: 2023-02-07

## 2023-02-07 RX ORDER — HYDROCODONE BITARTRATE AND ACETAMINOPHEN 5; 325 MG/1; MG/1
1 TABLET ORAL EVERY 6 HOURS PRN
Qty: 28 TABLET | Refills: 0 | Status: SHIPPED | OUTPATIENT
Start: 2023-02-07 | End: 2023-02-07 | Stop reason: SDUPTHER

## 2023-02-07 RX ORDER — SODIUM CHLORIDE 0.9 % (FLUSH) 0.9 %
5-40 SYRINGE (ML) INJECTION PRN
Status: DISCONTINUED | OUTPATIENT
Start: 2023-02-07 | End: 2023-02-07 | Stop reason: HOSPADM

## 2023-02-07 RX ORDER — SODIUM CHLORIDE 9 MG/ML
INJECTION, SOLUTION INTRAVENOUS PRN
Status: DISCONTINUED | OUTPATIENT
Start: 2023-02-07 | End: 2023-02-07 | Stop reason: HOSPADM

## 2023-02-07 RX ORDER — MORPHINE SULFATE 4 MG/ML
4 INJECTION, SOLUTION INTRAMUSCULAR; INTRAVENOUS
Status: CANCELLED | OUTPATIENT
Start: 2023-02-07

## 2023-02-07 RX ORDER — ROCURONIUM BROMIDE 10 MG/ML
INJECTION, SOLUTION INTRAVENOUS PRN
Status: DISCONTINUED | OUTPATIENT
Start: 2023-02-07 | End: 2023-02-07 | Stop reason: SDUPTHER

## 2023-02-07 RX ORDER — SODIUM CHLORIDE 9 MG/ML
INJECTION, SOLUTION INTRAVENOUS PRN
Status: CANCELLED | OUTPATIENT
Start: 2023-02-07

## 2023-02-07 RX ORDER — HYDROCODONE BITARTRATE AND ACETAMINOPHEN 5; 325 MG/1; MG/1
1 TABLET ORAL EVERY 6 HOURS PRN
Status: DISCONTINUED | OUTPATIENT
Start: 2023-02-07 | End: 2023-02-07 | Stop reason: HOSPADM

## 2023-02-07 RX ORDER — PROCHLORPERAZINE EDISYLATE 5 MG/ML
5 INJECTION INTRAMUSCULAR; INTRAVENOUS
Status: DISCONTINUED | OUTPATIENT
Start: 2023-02-07 | End: 2023-02-07 | Stop reason: HOSPADM

## 2023-02-07 RX ORDER — HYDROCODONE BITARTRATE AND ACETAMINOPHEN 5; 325 MG/1; MG/1
TABLET ORAL
Status: COMPLETED
Start: 2023-02-07 | End: 2023-02-07

## 2023-02-07 RX ORDER — PROPOFOL 10 MG/ML
INJECTION, EMULSION INTRAVENOUS PRN
Status: DISCONTINUED | OUTPATIENT
Start: 2023-02-07 | End: 2023-02-07 | Stop reason: SDUPTHER

## 2023-02-07 RX ORDER — HYDROCODONE BITARTRATE AND ACETAMINOPHEN 5; 325 MG/1; MG/1
1 TABLET ORAL EVERY 6 HOURS PRN
Qty: 28 TABLET | Refills: 0 | Status: SHIPPED | OUTPATIENT
Start: 2023-02-07 | End: 2023-02-14

## 2023-02-07 RX ORDER — INDOMETHACIN 25 MG/1
25 CAPSULE ORAL 3 TIMES DAILY
Qty: 84 CAPSULE | Refills: 0 | Status: SHIPPED | OUTPATIENT
Start: 2023-02-08 | End: 2023-03-08

## 2023-02-07 RX ORDER — MIDAZOLAM HYDROCHLORIDE 1 MG/ML
INJECTION INTRAMUSCULAR; INTRAVENOUS PRN
Status: DISCONTINUED | OUTPATIENT
Start: 2023-02-07 | End: 2023-02-07 | Stop reason: SDUPTHER

## 2023-02-07 RX ORDER — SODIUM CHLORIDE 0.9 % (FLUSH) 0.9 %
5-40 SYRINGE (ML) INJECTION PRN
Status: CANCELLED | OUTPATIENT
Start: 2023-02-07

## 2023-02-07 RX ADMIN — WATER 2000 MG: 1 INJECTION INTRAMUSCULAR; INTRAVENOUS; SUBCUTANEOUS at 13:15

## 2023-02-07 RX ADMIN — HYDROMORPHONE HYDROCHLORIDE 0.5 MG: 1 INJECTION, SOLUTION INTRAMUSCULAR; INTRAVENOUS; SUBCUTANEOUS at 16:36

## 2023-02-07 RX ADMIN — FENTANYL CITRATE 50 MCG: 50 INJECTION, SOLUTION INTRAMUSCULAR; INTRAVENOUS at 13:14

## 2023-02-07 RX ADMIN — FENTANYL CITRATE 100 MCG: 50 INJECTION, SOLUTION INTRAMUSCULAR; INTRAVENOUS at 13:28

## 2023-02-07 RX ADMIN — ONDANSETRON 4 MG: 2 INJECTION INTRAMUSCULAR; INTRAVENOUS at 14:31

## 2023-02-07 RX ADMIN — FENTANYL CITRATE 50 MCG: 50 INJECTION, SOLUTION INTRAMUSCULAR; INTRAVENOUS at 14:39

## 2023-02-07 RX ADMIN — FENTANYL CITRATE 50 MCG: 50 INJECTION, SOLUTION INTRAMUSCULAR; INTRAVENOUS at 14:11

## 2023-02-07 RX ADMIN — HYDROCODONE BITARTRATE AND ACETAMINOPHEN 1 TABLET: 5; 325 TABLET ORAL at 17:15

## 2023-02-07 RX ADMIN — SODIUM CHLORIDE: 9 INJECTION, SOLUTION INTRAVENOUS at 14:30

## 2023-02-07 RX ADMIN — HYDROMORPHONE HYDROCHLORIDE 0.5 MG: 1 INJECTION, SOLUTION INTRAMUSCULAR; INTRAVENOUS; SUBCUTANEOUS at 16:44

## 2023-02-07 RX ADMIN — LIDOCAINE HYDROCHLORIDE 100 MG: 20 INJECTION, SOLUTION EPIDURAL; INFILTRATION; INTRACAUDAL; PERINEURAL at 13:14

## 2023-02-07 RX ADMIN — HYDROMORPHONE HYDROCHLORIDE 1 MG: 2 INJECTION, SOLUTION INTRAMUSCULAR; INTRAVENOUS; SUBCUTANEOUS at 15:17

## 2023-02-07 RX ADMIN — PROPOFOL 200 MG: 10 INJECTION, EMULSION INTRAVENOUS at 13:14

## 2023-02-07 RX ADMIN — SODIUM CHLORIDE: 9 INJECTION, SOLUTION INTRAVENOUS at 13:00

## 2023-02-07 RX ADMIN — DEXAMETHASONE SODIUM PHOSPHATE 10 MG: 4 INJECTION, SOLUTION INTRAMUSCULAR; INTRAVENOUS at 13:30

## 2023-02-07 RX ADMIN — SUGAMMADEX 200 MG: 100 INJECTION, SOLUTION INTRAVENOUS at 15:33

## 2023-02-07 RX ADMIN — MIDAZOLAM 2 MG: 1 INJECTION INTRAMUSCULAR; INTRAVENOUS at 13:00

## 2023-02-07 RX ADMIN — ROCURONIUM BROMIDE 50 MG: 10 INJECTION, SOLUTION INTRAVENOUS at 13:14

## 2023-02-07 ASSESSMENT — PAIN DESCRIPTION - DESCRIPTORS
DESCRIPTORS: DISCOMFORT
DESCRIPTORS: DISCOMFORT

## 2023-02-07 ASSESSMENT — PAIN SCALES - GENERAL
PAINLEVEL_OUTOF10: 8
PAINLEVEL_OUTOF10: 4
PAINLEVEL_OUTOF10: 0
PAINLEVEL_OUTOF10: 8
PAINLEVEL_OUTOF10: 7

## 2023-02-07 ASSESSMENT — PAIN DESCRIPTION - LOCATION
LOCATION: ARM

## 2023-02-07 ASSESSMENT — PAIN DESCRIPTION - ORIENTATION
ORIENTATION: RIGHT

## 2023-02-07 ASSESSMENT — LIFESTYLE VARIABLES: SMOKING_STATUS: 0

## 2023-02-07 ASSESSMENT — PAIN - FUNCTIONAL ASSESSMENT
PAIN_FUNCTIONAL_ASSESSMENT: PREVENTS OR INTERFERES SOME ACTIVE ACTIVITIES AND ADLS

## 2023-02-07 NOTE — OP NOTE
Operative Note      Patient: Harry Caal  YOB: 1970  MRN: [id number]    Date of Procedure: 2/7/2023    Pre-Op Diagnosis: Traumatic partial tear of right biceps tendon, initial encounter Umair Eldridge    Post-Op Diagnosis:  Complete rupture of the right distal bicep       Procedure(s):  RIGHT DISTAL BICEPS TENDON REPAIR    Surgeon(s):  Michael Pacheco DO    Assistant:   * No surgical staff found *    Anesthesia: General    Estimated Blood Loss (mL): less than 50     Complications: None    Specimens:   * No specimens in log *    Implants:  * No implants in log *      Drains: * No LDAs found *    Findings: Complete tear of the distal bicep tendon with minimal tendinosis    Detailed Description of Procedure:   Patient was initially greeted in the preoperative holding area. All final questions were answered. The correct operative extremity was marked with indelible skin marker. The patient was then taken to the operative suite and placed in supine position on the operating table. A dose of preoperative antibiotics was given. General anesthesia was then induced per the anesthesia team.  The correct operative extremity was then prepped and draped in a sterile orthopedic fashion. A pneumatic tourniquet was applied at the proximal aspect of the operative right upper extremity. A timeout was performed which patient, operative extremity as well as the procedure were confirmed by all parties present. The operative extremity was then exsanguinated using an Esmarch and the tourniquet was insufflated 250 mmHg. I began by making a standard horizontal incision several centimeters distal to the antecubital elbow crease. Bleeding vessels were coagulated using the Bovie cautery. The brachial fascia was identified and split longitudinally with a fresh 15 scalpel blade. I carefully dissected down to the distal aspect of the bicep tendon. I was able to hook underneath of the biceps tendon with my finger. With pressure, I was able to mobilize the tendon and extract this from the wound. I placed an Allis clamp about the bicep tendon and began debriding the nonviable portions of the tendon. Once the tendon was prepared, I used FiberWire suture in order to whipstitch the tendon in a locking fashion. Both the long head and short head of the distal biceps tendon was captured. A longitudinal incision was then created at the dorsal aspect of the forearm. I centered the incision 2 cm lateral to the most lateral border of the ulna, remaining below the proximal third of the forearm to avoid injury to the PIN. Similarly, the Bovie was used to coagulate subcutaneous bleeding vessels. The ECU fascia was incised sharply in line with the incision. I then used a key elevator to elevate the underlying extensor musculature. The bursa overlying the supinator was incised sharply with a scalpel blade. The fat overlying the radial tuberosity/bursa was identified. There was remaining stump of bicep tendon about the radial tuberosity. This was debrided using a key elevator as well as a synovial rongeur. Throughout the remainder of this portion of the case, the arm was held into pronation to avoid irritation to the PIN. Blunt Hohmann retractors were carefully placed about the radial tuberosity. Once this was completely denuded, I used a 4.0 mm bur to create a trough within the radial tuberosity. At the lateral aspect of the tuberosity, I used a 2-0 drill bit to create to holes for later suture passage. Approximately 7 mm of a bony bridge was left for the biceps repair. At this point, the Arthrex retrograde passer was placed adjacent to the radial tuberosity through the dorsal wound. The arm was carefully supinated and extended. Using my finger, I was able to appreciate the anatomic tunnel of the distal biceps tendon. The retropasser was retrieved out the volar wound/incision.   The retropasser was then used to pass a dilator antegrade through the volar incision, retrieving this out the dorsal incision. Several alternating passes were made with the dilator in order to further open up the sheath of the distal bicep tendon. Once this was complete, the more proximal end of the dilator was used to shuttle the FiberWire sutures of the distal biceps tendon from the volar wound into the dorsal wound. I was able to appreciate that the bicep tendon was adjacent to the radial tuberosity at the dorsal wound. At this point, the volar wound was copiously irrigated using normal saline. A standard layered closure was performed consisting of 0 Vicryl suture in the brachial fascia, 2-0 Vicryl suture in the subcutaneous tissue and 3-0 Monocryl. Dermabond and Steri-Strips were then placed. The arm was then held into flexion, the wrist pronated and the retractors were repositioned about the radial tuberosity dorsally. Using a free needle, I was able to shuttle the FiberWire sutures from the distal biceps tendon through the bony trough and out of the drill holes. With careful traction, I was able to reduce the bicep tendon into the created trough. The sutures were then tied over a bony bridge, securing the knots with interrupted half hitches. Appropriate tension was maintained. The free margins of the suture were then cut. As I carefully pronated and supinated the forearm, and noted that there was anatomic reduction of the distal bicep tendon, and the tendon mobilized as a single unit with manipulation of the forearm. The dorsal wound was then copiously irrigated using normal saline. Similarly, a standard anatomic closure was performed. I used 0 Vicryl suture within the ECU fascia. 2-0 Vicryl suture and 3-0 Monocryl were used in subcutaneous tissue and skin margins respectively. Dermabond and Steri-Strips were placed.   I injected roughly 20 cc of local anesthetic into the subcutaneous tissues about both the dorsal and volar incisions. A well-padded elbow splint immobilized at 90 degrees of flexion was then placed. The tourniquet was deflated, anesthesia was reversed and patient was taken recovery in stable condition. There are no apparent complications. Disposition: Patient will be discharged home to previous residence. He was provided with prescriptions for Norco to control pain as well as Indocin to prevent heterotopic ossification. He will be contacted by office for follow-up in several days to remove his splint and apply hinged elbow brace.     Electronically signed by Sonal Coon DO on 2/7/2023 at 3:53 PM

## 2023-02-07 NOTE — DISCHARGE INSTRUCTIONS
Maintain splint, keeping clean and dry  Cover your splint with a plastic bag when showering  Take pain medication as needed  Take Indocin as prescribed to prevent heterotopic ossification  Resume your home medication beginning postop day #1  Call the office with questions or concerns  Follow-up in office as previously scheduled  You will be contacted by the office to have your splint removed and a hinged elbow brace applied in several days

## 2023-02-07 NOTE — H&P
History and Physical      CHIEF COMPLAINT: Right arm weakness    History of Present Illness:  Jovana Shipman is a 47y.o. year-old male presents for definitive treatment of his right arm injury. He was seen in my office and there was concern for an acute tear of the right distal bicep tendon. MRI was obtained which confirmed this diagnosis. We discussed both operative and nonoperative treatment options in my office. Patient elected to proceed with surgery. He is here today for surgical repair. No changes in symptoms. He denies any recent fevers, chills, nausea or vomiting. Past Medical History:   Diagnosis Date    CAD (coronary artery disease)     Chronic sinus complaints     Hx of blood clots     Hyperlipidemia     Hypertension     Prolonged emergence from general anesthesia     Tear of biceps muscle, right, sequela          Past Surgical History:   Procedure Laterality Date    ARM SURGERY Left     BACK SURGERY      COLONOSCOPY      CORONARY ANGIOPLASTY WITH STENT PLACEMENT      HERNIA REPAIR         Medications Prior to Admission:    Prior to Admission medications    Medication Sig Start Date End Date Taking? Authorizing Provider   meloxicam (MOBIC) 15 MG tablet Take 1 tablet by mouth daily 12/19/22 3/19/23  Wallace Black DPM   diclofenac sodium (VOLTAREN) 1 % GEL Apply 4 g topically 4 times daily 12/19/22   Wallace Black DPM   empagliflozin (JARDIANCE) 10 MG tablet Take 10 mg by mouth in the morning.     Historical Provider, MD   albuterol sulfate HFA (VENTOLIN HFA) 108 (90 Base) MCG/ACT inhaler Inhale 2 puffs into the lungs 4 times daily as needed for Wheezing  Patient not taking: Reported on 2/3/2023 7/21/21   Jesse Jeronimo,    cetirizine (ZYRTEC) 10 MG tablet Take 1 tablet by mouth daily 5/6/21   Ozzie Miles DO   fluticasone [facility] ALLERGY RELIEF) 50 MCG/ACT nasal spray 1 spray by Each Nostril route daily 5/6/21   Ozzie Miles DO   Cyanocobalamin (B-12) 5000 MCG CAPS Take 1 capsule by mouth daily    Historical Provider, MD   BRILINTA 90 MG TABS tablet Take 90 mg by mouth 2 times daily  9/5/19   Historical Provider, MD   pantoprazole (PROTONIX) 40 MG tablet Take 40 mg by mouth daily  8/31/19   Historical Provider, MD Ledesma Veronica [facility] [address] [facility]  7/1/19   Historical Provider, MD   ONE TOUCH ULTRA TEST strip  7/1/19   Historical Provider, MD   carvedilol (COREG) 3.125 MG tablet Take 3.125 mg by mouth 2 times daily (with meals)  8/31/19   Historical Provider, MD   aspirin 81 MG EC tablet Take 81 mg by mouth daily 9/5/19   Historical Provider, MD       Allergies:    Lisinopril, Atorvastatin, Codeine, Morphine, Rosuvastatin, and Sulfa antibiotics    Social History:    reports that he quit smoking about 4 years ago. His smoking use included cigarettes. He started smoking about 35 years ago. He has a 36.00 pack-year smoking history. He has never used smokeless tobacco. He reports current alcohol use. He reports that he does not use drugs. Family History:   family history includes Heart Attack in his father. REVIEW OF SYSTEMS:  As above in the HPI, otherwise negative    PHYSICAL EXAM:    Vitals:  Ht 5' 8\" (1.727 m)   Wt 200 lb (90.7 kg)   BMI 30.41 kg/m²     General:  Awake, alert, oriented X 3. Well developed, well nourished, well groomed. No apparent distress. HEENT:  Normocephalic, atraumatic. Pupils equal, round, reactive to light. No scleral icterus. No conjunctival injection. Normal lips, teeth, and gums. No nasal discharge. Neck:  Supple No palpable cervical or supraclavicular nodes. Carotids brisk in upstroke , without carotid bruits. No abnormal JVP at 45°. Thyroid not palpable. Chest: Even excursion. Heart:  Regular regular, S1> S2 no murmurs, gallops, or rubs. PMI 5th intercostal space midclavicular line. Lungs: CTA bilaterally, bilat symmetrical expansion, no wheeze, rales, or rhonchi. No decreased tactile fremitus. Abdomen:  Bowel sounds present, soft, nontender, no masses, no organomegaly, no peritoneal signs  Extremities: Right upper extremity examination unchanged from previous. Patient has full strength and sensation of the right upper extremity save for supination. There is a negative hook test, as I was unable to appreciate the distal bicep tendon insertion. Noticeable weakness with supination with the arm in 90 degrees of flexion. Compartment soft compressible. Skin: Warm and dry, no open lesions or rash  Neuro:  Cranial nerves 2-12 intact, no focal sensory or motor deficits  Breast: deferred  Rectal: deferred  Genitalia:  deferred    LABS:  CBC:   Lab Results   Component Value Date/Time    WBC 11.2 01/19/2023 11:51 AM    RBC 4.34 01/19/2023 11:51 AM    HGB 13.5 01/19/2023 11:51 AM    HCT 40.2 01/19/2023 11:51 AM    MCV 92.6 01/19/2023 11:51 AM    MCH 31.1 01/19/2023 11:51 AM    MCHC 33.6 01/19/2023 11:51 AM    RDW 13.1 01/19/2023 11:51 AM     01/19/2023 11:51 AM    MPV 9.8 01/19/2023 11:51 AM         ASSESSMENT:      Patient Active Problem List   Diagnosis    Essential hypertension    Hyperlipidemia LDL goal <100    Chest pain    CAD (coronary artery disease), native coronary artery    Obesity (BMI 30-39. 9)    S/P coronary artery stent placement    Chest congestion    NSTEMI (non-ST elevated myocardial infarction) ([address]       PLAN:    As mentioned, we obtained a preoperative MRI which confirmed a complete tear of the right distal bicep tendon. Patient would like to pursue surgery. We discussed this procedure at length in my office, specifically a 2 incision distal biceps tendon repair. The surgery as well as subsequent recovery, all risk, benefits alternatives of treatment were reviewed. These include but are not limited to loss of life, loss of limb, bleeding, infection, damage surrounding structures and potential need for further surgery. Informed sent was obtained and verified.   Patient will be seen in 5 to 7 days for dressing change and conversion into a hinged elbow brace locked at 90 degrees of flexion. He will follow-up with me in 2 weeks.     Sally Daniels DO   7:31 PM  2/6/2023

## 2023-02-07 NOTE — PROGRESS NOTES
CLINICAL PHARMACY NOTE: MEDS TO BEDS    Total # of Prescriptions Filled: 1   The following medications were delivered to the patient:  Norco 5/ 325 mg    Additional Documentation:   Transferred one to [facility] -indomethacin

## 2023-02-07 NOTE — ANESTHESIA POSTPROCEDURE EVALUATION
Department of Anesthesiology  Postprocedure Note    Patient: Christine Chavez  MRN: [id number]  YOB: 1970  Date of evaluation: 2/7/2023      Procedure Summary     Date: 02/07/23 Room / Location: [facility] OR 05 / [facility]    Anesthesia Start: 1300 Anesthesia Stop: 7654    Procedure: RIGHT DISTAL BICEPS TENDON REPAIR (Right: Arm Upper) Diagnosis:       Traumatic partial tear of right biceps tendon, initial encounter      (Traumatic partial tear of right biceps tendon, initial encounter [K25.519T])    Surgeons: Mark Awad DO Responsible Provider: Teto Mccauley MD    Anesthesia Type: general ASA Status: 3          Anesthesia Type: No value filed.     Jeri Phase I: Jeri Score: 8    Jeri Phase II:        Anesthesia Post Evaluation    Patient location during evaluation: PACU  Patient participation: complete - patient participated  Level of consciousness: awake and alert  Pain score: 5  Airway patency: patent  Nausea & Vomiting: no vomiting and no nausea  Complications: no  Cardiovascular status: hemodynamically stable  Respiratory status: spontaneous ventilation  Hydration status: stable

## 2023-02-07 NOTE — ANESTHESIA PRE PROCEDURE
Department of Anesthesiology  Preprocedure Note       Name:  Chevy White   Age:  47 y.o.  :  1970                                          MRN:  [id number]         Date:  2023      Surgeon: Pattie Paez):  Dennie Hansen Berdis, DO    Procedure: Procedure(s):  RIGHT DISTAL BICEPS TENDON REPAIR   +++ARTHREX+++    Medications prior to admission:   Prior to Admission medications    Medication Sig Start Date End Date Taking? Authorizing Provider   meloxicam (MOBIC) 15 MG tablet Take 1 tablet by mouth daily 12/19/22 3/19/23  Elyce Bumps, DPM   diclofenac sodium (VOLTAREN) 1 % GEL Apply 4 g topically 4 times daily 22   Elyce Bumps, DPM   empagliflozin (JARDIANCE) 10 MG tablet Take 10 mg by mouth in the morning.     Historical Provider, MD   albuterol sulfate HFA (VENTOLIN HFA) 108 (90 Base) MCG/ACT inhaler Inhale 2 puffs into the lungs 4 times daily as needed for Wheezing  Patient not taking: Reported on 2/3/2023 7/21/21   Jesse Jeronimo DO   cetirizine (ZYRTEC) 10 MG tablet Take 1 tablet by mouth daily 21   Daly Miles DO   fluticasone [facility] ALLERGY RELIEF) 50 MCG/ACT nasal spray 1 spray by Each Nostril route daily 21   Valencia Mohs, DO   Cyanocobalamin (B-12) 5000 MCG CAPS Take 1 capsule by mouth daily    Historical Provider, MD   BRILINTA 90 MG TABS tablet Take 90 mg by mouth 2 times daily  19   Historical Provider, MD   pantoprazole (PROTONIX) 40 MG tablet Take 40 mg by mouth daily  19   Historical Provider, MD   [address] [facility] [place] LANCETS 40D [address] [facility]  19   Historical Provider, MD   ONE TOUCH ULTRA TEST strip  19   Historical Provider, MD   carvedilol (COREG) 3.125 MG tablet Take 3.125 mg by mouth 2 times daily (with meals)  19   Historical Provider, MD   aspirin 81 MG EC tablet Take 81 mg by mouth daily 19   Historical Provider, MD       Current medications:    Current Facility-Administered Medications   Medication Dose Route Frequency Provider Last Rate Last Admin    sodium chloride flush 0.9 % injection 5-40 mL  5-40 mL IntraVENous 2 times per day Angelo Coyer Berdis, DO        sodium chloride flush 0.9 % injection 5-40 mL  5-40 mL IntraVENous PRN Angelo Coyer Berdis, DO        0.9 % sodium chloride infusion   IntraVENous PRN Angelo Coyer Berdis, DO        ceFAZolin (ANCEF) 2,000 mg in sterile water 20 mL IV syringe  2,000 mg IntraVENous On Call to [address],            Allergies: Allergies   Allergen Reactions    Lisinopril Other (See Comments)     Hypotension/Syncope    Atorvastatin Other (See Comments)    Codeine     Morphine     Rosuvastatin      Other reaction(s): Myalgia    Sulfa Antibiotics        Problem List:    Patient Active Problem List   Diagnosis Code    Essential hypertension I10    Hyperlipidemia LDL goal <100 E78.5    Chest pain R07.9    CAD (coronary artery disease), native coronary artery I25.10    Obesity (BMI 30-39. 9) E66.9    S/P coronary artery stent placement Z95.5    Chest congestion R09.89    NSTEMI (non-ST elevated myocardial infarction) ([facility]) I21.4       Past Medical History:        Diagnosis Date    CAD (coronary artery disease)     Chronic sinus complaints     Hx of blood clots     Hyperlipidemia     Hypertension     Prolonged emergence from general anesthesia     Tear of biceps muscle, right, sequela        Past Surgical History:        Procedure Laterality Date    ARM SURGERY Left     BACK SURGERY      COLONOSCOPY      CORONARY ANGIOPLASTY WITH STENT PLACEMENT      HERNIA REPAIR         Social History:    Social History     Tobacco Use    Smoking status: Former     Packs/day: 2.00     Years: 18.00     Pack years: 36.00     Types: Cigarettes     Start date: 3/20/1987     Quit date: 2019     Years since quittin.1    Smokeless tobacco: Never   Substance Use Topics    Alcohol use: Yes     Comment: Social. No coffee.  Does drink 3 [address] [place] a day                                Counseling given: Not Answered      Vital Signs (Current):   Vitals:    02/03/23 1034 02/07/23 1132   BP:  (!) 130/91   Pulse:  60   Resp:  16   Temp:  98.1 °F (36.7 °C)   TempSrc:  Temporal   SpO2:  (!) 62%   Weight: 200 lb (90.7 kg) 200 lb (90.7 kg)   Height: 5' 8\" (1.727 m) 5' 8\" (1.727 m)                                              BP Readings from Last 3 Encounters:   02/07/23 (!) 130/91   01/19/23 103/62   12/19/22 132/79       NPO Status: Time of last liquid consumption: 2200                        Time of last solid consumption: 2200                        Date of last liquid consumption: 02/06/23                        Date of last solid food consumption: 02/06/23    BMI:   Wt Readings from Last 3 Encounters:   02/07/23 200 lb (90.7 kg)   01/19/23 200 lb (90.7 kg)   12/19/22 205 lb (93 kg)     Body mass index is 30.41 kg/m². CBC:   Lab Results   Component Value Date/Time    WBC 8.4 02/07/2023 11:03 AM    RBC 4.71 02/07/2023 11:03 AM    HGB 14.5 02/07/2023 11:03 AM    HCT 42.6 02/07/2023 11:03 AM    MCV 90.4 02/07/2023 11:03 AM    RDW 12.9 02/07/2023 11:03 AM     02/07/2023 11:03 AM       CMP:   Lab Results   Component Value Date/Time     02/07/2023 11:03 AM    K 4.1 02/07/2023 11:03 AM    K 3.7 01/19/2023 11:51 AM     02/07/2023 11:03 AM    CO2 23 02/07/2023 11:03 AM    BUN 11 02/07/2023 11:03 AM    CREATININE 0.8 02/07/2023 11:03 AM    GFRAA >60 08/01/2022 04:29 PM    LABGLOM >60 02/07/2023 11:03 AM    GLUCOSE 126 02/07/2023 11:03 AM    PROT 6.3 01/19/2023 11:51 AM    CALCIUM 8.9 02/07/2023 11:03 AM    BILITOT 0.7 01/19/2023 11:51 AM    ALKPHOS 87 01/19/2023 11:51 AM    AST 16 01/19/2023 11:51 AM    ALT 24 01/19/2023 11:51 AM       POC Tests: No results for input(s): POCGLU, POCNA, POCK, POCCL, POCBUN, POCHEMO, POCHCT in the last 72 hours.     Coags: No results found for: PROTIME, INR, APTT    HCG (If Applicable): No results found for: PREGTESTUR, PREGSERUM, HCG, HCGQUANT     ABGs: No results found for: PHART, PO2ART, GGK5IKC, LWA6YLL, BEART, J3MHGAAQ     Type & Screen (If Applicable):  No results found for: LABABO, LABRH    Drug/Infectious Status (If Applicable):  No results found for: HIV, HEPCAB    COVID-19 Screening (If Applicable):   Lab Results   Component Value Date/Time    COVID19 Not Detected 08/02/2022 11:20 AM    COVID19 Not Detected 01/03/2022 12:00 PM           Anesthesia Evaluation  Patient summary reviewed and Nursing notes reviewed history of anesthetic complications (prolonged emergence): Airway: Mallampati: II  TM distance: >3 FB   Neck ROM: full  Mouth opening: > = 3 FB   Dental:      Comment: Several missing teeth    Pulmonary:normal exam        (-) not a current smoker (former smoker)                           Cardiovascular:    (+) hypertension:, past MI:, CAD:, CABG/stent (cardiacm stent): no interval change, hyperlipidemia                  Neuro/Psych:   Negative Neuro/Psych ROS              GI/Hepatic/Renal: Neg GI/Hepatic/Renal ROS            Endo/Other:    (+) DiabetesType II DM, no interval change, , .                 Abdominal:             Vascular: negative vascular ROS. Other Findings:           Anesthesia Plan      general     ASA 3       Induction: intravenous. MIPS: Postoperative opioids intended and Prophylactic antiemetics administered. Anesthetic plan and risks discussed with patient. Plan discussed with CRNA.                     Jonelle Oquendo MD   2/7/2023

## 2024-06-05 ENCOUNTER — OFFICE VISIT (OUTPATIENT)
Dept: FAMILY MEDICINE CLINIC | Age: 54
End: 2024-06-05
Payer: COMMERCIAL

## 2024-06-05 VITALS
WEIGHT: 201 LBS | DIASTOLIC BLOOD PRESSURE: 84 MMHG | HEART RATE: 78 BPM | OXYGEN SATURATION: 95 % | TEMPERATURE: 98.3 F | HEIGHT: 68 IN | BODY MASS INDEX: 30.46 KG/M2 | SYSTOLIC BLOOD PRESSURE: 120 MMHG

## 2024-06-05 DIAGNOSIS — J01.40 ACUTE NON-RECURRENT PANSINUSITIS: Primary | ICD-10-CM

## 2024-06-05 PROCEDURE — 3017F COLORECTAL CA SCREEN DOC REV: CPT | Performed by: NURSE PRACTITIONER

## 2024-06-05 PROCEDURE — 3074F SYST BP LT 130 MM HG: CPT | Performed by: NURSE PRACTITIONER

## 2024-06-05 PROCEDURE — 99213 OFFICE O/P EST LOW 20 MIN: CPT | Performed by: NURSE PRACTITIONER

## 2024-06-05 PROCEDURE — 3079F DIAST BP 80-89 MM HG: CPT | Performed by: NURSE PRACTITIONER

## 2024-06-05 PROCEDURE — 1036F TOBACCO NON-USER: CPT | Performed by: NURSE PRACTITIONER

## 2024-06-05 PROCEDURE — G8427 DOCREV CUR MEDS BY ELIG CLIN: HCPCS | Performed by: NURSE PRACTITIONER

## 2024-06-05 PROCEDURE — G8417 CALC BMI ABV UP PARAM F/U: HCPCS | Performed by: NURSE PRACTITIONER

## 2024-06-05 RX ORDER — METHYLPREDNISOLONE 4 MG/1
TABLET ORAL
Qty: 1 KIT | Refills: 0 | Status: SHIPPED | OUTPATIENT
Start: 2024-06-05

## 2024-06-05 RX ORDER — AMOXICILLIN 875 MG/1
875 TABLET, COATED ORAL 2 TIMES DAILY
Qty: 14 TABLET | Refills: 0 | Status: SHIPPED | OUTPATIENT
Start: 2024-06-05 | End: 2024-06-12

## 2024-06-05 NOTE — PROGRESS NOTES
Chief Complaint   Pharyngitis (Sore throat, has been out of Singulair for 2 weeks and is having a lot of drainage ), Headache, and Fatigue      HPI   Source of history provided by: patient.      Giancarlo Toure is a 53 y.o. old male who presents to walk-in care for evaluation of sore throat X few days. Associated symptoms include headache, sore throat, and malaise.  Since onset symptoms have been about the same. Has taken singulair and mucinex at home with some symptomatic relief. Denies fever, chills, cough, wheezing, chest congestion, chest pain, shortness of breath, abdominal discomfort, nausea, vomiting, body aches, otalgia, and malaise. Pertinent PMH of: PMHpositive: CAD and HTN.  The patient has a history of tobacco abuse and has quit.    ROS   Pertinent positives and negatives are stated within HPI, all other systems reviewed and are negative.  Past Medical History:  has a past medical history of CAD (coronary artery disease), Chronic sinus complaints, Hx of blood clots, Hyperlipidemia, Hypertension, Prolonged emergence from general anesthesia, and Tear of biceps muscle, right, sequela.  Surgical History:  has a past surgical history that includes hernia repair; back surgery; Coronary angioplasty with stent; Arm Surgery (Left); Colonoscopy; and Biceps tendon repair (Right, 2/7/2023).  Social History:  reports that he quit smoking about 5 years ago. His smoking use included cigarettes. He started smoking about 37 years ago. He has a 63.6 pack-year smoking history. He has never used smokeless tobacco. He reports current alcohol use. He reports that he does not use drugs.  Family History: family history includes Heart Attack in his father.  Allergies: Lisinopril, Atorvastatin, Codeine, Morphine, Rosuvastatin, and Sulfa antibiotics    Physical Exam      VS:  /84   Pulse 78   Temp 98.3 °F (36.8 °C)   Ht 1.727 m (5' 8\")   Wt 91.2 kg (201 lb)   SpO2 95%   BMI 30.56 kg/m²    Oxygen Saturation

## 2024-07-16 ENCOUNTER — OFFICE VISIT (OUTPATIENT)
Dept: FAMILY MEDICINE CLINIC | Age: 54
End: 2024-07-16
Payer: COMMERCIAL

## 2024-07-16 VITALS
HEART RATE: 60 BPM | WEIGHT: 202 LBS | DIASTOLIC BLOOD PRESSURE: 70 MMHG | BODY MASS INDEX: 30.62 KG/M2 | TEMPERATURE: 97.9 F | HEIGHT: 68 IN | SYSTOLIC BLOOD PRESSURE: 110 MMHG | OXYGEN SATURATION: 94 % | RESPIRATION RATE: 18 BRPM

## 2024-07-16 DIAGNOSIS — J34.89 SINUS DRAINAGE: ICD-10-CM

## 2024-07-16 DIAGNOSIS — R05.1 ACUTE COUGH: ICD-10-CM

## 2024-07-16 DIAGNOSIS — J01.90 ACUTE SINUSITIS, RECURRENCE NOT SPECIFIED, UNSPECIFIED LOCATION: Primary | ICD-10-CM

## 2024-07-16 PROCEDURE — 99213 OFFICE O/P EST LOW 20 MIN: CPT | Performed by: INTERNAL MEDICINE

## 2024-07-16 PROCEDURE — G8427 DOCREV CUR MEDS BY ELIG CLIN: HCPCS | Performed by: INTERNAL MEDICINE

## 2024-07-16 PROCEDURE — G8417 CALC BMI ABV UP PARAM F/U: HCPCS | Performed by: INTERNAL MEDICINE

## 2024-07-16 PROCEDURE — 3078F DIAST BP <80 MM HG: CPT | Performed by: INTERNAL MEDICINE

## 2024-07-16 PROCEDURE — 1036F TOBACCO NON-USER: CPT | Performed by: INTERNAL MEDICINE

## 2024-07-16 PROCEDURE — 3017F COLORECTAL CA SCREEN DOC REV: CPT | Performed by: INTERNAL MEDICINE

## 2024-07-16 PROCEDURE — 3074F SYST BP LT 130 MM HG: CPT | Performed by: INTERNAL MEDICINE

## 2024-07-16 RX ORDER — AMOXICILLIN AND CLAVULANATE POTASSIUM 875; 125 MG/1; MG/1
1 TABLET, FILM COATED ORAL 2 TIMES DAILY
Qty: 20 TABLET | Refills: 0 | Status: SHIPPED | OUTPATIENT
Start: 2024-07-16 | End: 2024-07-26

## 2024-07-16 RX ORDER — PREDNISONE 10 MG/1
TABLET ORAL
Qty: 12 TABLET | Refills: 0 | Status: SHIPPED | OUTPATIENT
Start: 2024-07-16 | End: 2024-07-21

## 2024-07-16 ASSESSMENT — ENCOUNTER SYMPTOMS
DIARRHEA: 0
CHEST TIGHTNESS: 0
COUGH: 1
VOMITING: 0
NAUSEA: 0
SHORTNESS OF BREATH: 0
WHEEZING: 0
SORE THROAT: 0
ABDOMINAL PAIN: 0
SINUS PRESSURE: 1
EYES NEGATIVE: 1

## 2024-07-16 NOTE — PROGRESS NOTES
[facility]     24  Giancarlo Toure : 1970 Sex: male  Age: 53 y.o.    Chief Complaint   Patient presents with    Nasal Congestion     X a few days        HPI    Patient presents today to express care complaining of 4 days worth of sinus drainage, head congestion, cough with thick mucus production and some facial pressure.  Has been exposed to COVID at the office and there was daughter recently however he has tested negative at home COVID.  Denies shortness of breath.  Denies fever or chills.        Review of Systems   Constitutional:  Negative for chills and fever.   HENT:  Positive for congestion, postnasal drip and sinus pressure. Negative for ear pain and sore throat.    Eyes: Negative.    Respiratory:  Positive for cough. Negative for chest tightness, shortness of breath and wheezing.    Cardiovascular:  Negative for chest pain.   Gastrointestinal:  Negative for abdominal pain, diarrhea, nausea and vomiting.   Musculoskeletal:  Negative for myalgias.   Neurological:  Negative for headaches.               REST OF PERTINENT ROS GONE OVER AND WAS NEGATIVE.                 Current Outpatient Medications:     amoxicillin-clavulanate (AUGMENTIN) 875-125 MG per tablet, Take 1 tablet by mouth 2 times daily for 10 days, Disp: 20 tablet, Rfl: 0    predniSONE (DELTASONE) 10 MG tablet, Take 3 tablets by mouth daily for 2 days, THEN 2 tablets daily for 2 days, THEN 1 tablet daily for 2 days., Disp: 12 tablet, Rfl: 0    meloxicam (MOBIC) 15 MG tablet, Take 1 tablet by mouth daily, Disp: 90 tablet, Rfl: 1    diclofenac sodium (VOLTAREN) 1 % GEL, Apply 4 g topically 4 times daily, Disp: 350 g, Rfl: 3    empagliflozin (JARDIANCE) 10 MG tablet, Take 1 tablet by mouth daily, Disp: , Rfl:     albuterol sulfate HFA (VENTOLIN HFA) 108 (90 Base) MCG/ACT inhaler, Inhale 2 puffs into the lungs 4 times daily as needed for Wheezing, Disp: 3 Inhaler, Rfl: 1    cetirizine (ZYRTEC) 10 MG tablet, Take 1 tablet by mouth

## 2024-11-07 ENCOUNTER — OFFICE VISIT (OUTPATIENT)
Dept: FAMILY MEDICINE CLINIC | Age: 54
End: 2024-11-07
Payer: COMMERCIAL

## 2024-11-07 VITALS
HEART RATE: 89 BPM | WEIGHT: 201 LBS | SYSTOLIC BLOOD PRESSURE: 130 MMHG | BODY MASS INDEX: 30.56 KG/M2 | OXYGEN SATURATION: 96 % | DIASTOLIC BLOOD PRESSURE: 80 MMHG | TEMPERATURE: 98.7 F

## 2024-11-07 DIAGNOSIS — J34.89 SINUS DRAINAGE: ICD-10-CM

## 2024-11-07 DIAGNOSIS — R05.1 ACUTE COUGH: ICD-10-CM

## 2024-11-07 DIAGNOSIS — J01.90 ACUTE SINUSITIS, RECURRENCE NOT SPECIFIED, UNSPECIFIED LOCATION: Primary | ICD-10-CM

## 2024-11-07 PROCEDURE — 1036F TOBACCO NON-USER: CPT | Performed by: INTERNAL MEDICINE

## 2024-11-07 PROCEDURE — 3017F COLORECTAL CA SCREEN DOC REV: CPT | Performed by: INTERNAL MEDICINE

## 2024-11-07 PROCEDURE — G8427 DOCREV CUR MEDS BY ELIG CLIN: HCPCS | Performed by: INTERNAL MEDICINE

## 2024-11-07 PROCEDURE — G8484 FLU IMMUNIZE NO ADMIN: HCPCS | Performed by: INTERNAL MEDICINE

## 2024-11-07 PROCEDURE — 99213 OFFICE O/P EST LOW 20 MIN: CPT | Performed by: INTERNAL MEDICINE

## 2024-11-07 PROCEDURE — 3075F SYST BP GE 130 - 139MM HG: CPT | Performed by: INTERNAL MEDICINE

## 2024-11-07 PROCEDURE — G8417 CALC BMI ABV UP PARAM F/U: HCPCS | Performed by: INTERNAL MEDICINE

## 2024-11-07 PROCEDURE — 3079F DIAST BP 80-89 MM HG: CPT | Performed by: INTERNAL MEDICINE

## 2024-11-07 RX ORDER — BENZONATATE 100 MG/1
100 CAPSULE ORAL 3 TIMES DAILY PRN
Qty: 30 CAPSULE | Refills: 0 | Status: SHIPPED | OUTPATIENT
Start: 2024-11-07 | End: 2024-11-17

## 2024-11-07 RX ORDER — MONTELUKAST SODIUM 10 MG/1
10 TABLET ORAL NIGHTLY
COMMUNITY

## 2024-11-07 RX ORDER — PREDNISONE 10 MG/1
TABLET ORAL
Qty: 12 TABLET | Refills: 0 | Status: SHIPPED | OUTPATIENT
Start: 2024-11-07 | End: 2024-11-12

## 2024-11-07 RX ORDER — CEFDINIR 300 MG/1
300 CAPSULE ORAL 2 TIMES DAILY
Qty: 14 CAPSULE | Refills: 0 | Status: SHIPPED | OUTPATIENT
Start: 2024-11-07 | End: 2024-11-14

## 2024-11-07 ASSESSMENT — ENCOUNTER SYMPTOMS
VOMITING: 0
EYES NEGATIVE: 1
SINUS PRESSURE: 1
WHEEZING: 0
SORE THROAT: 0
ABDOMINAL PAIN: 0
COUGH: 1
CHEST TIGHTNESS: 0
NAUSEA: 0
DIARRHEA: 0
SHORTNESS OF BREATH: 0

## 2024-11-07 NOTE — PROGRESS NOTES
Assessment and Plan:  Giancarlo SOUZA was seen today for sinus problem.    Diagnoses and all orders for this visit:    Acute sinusitis, recurrence not specified, unspecified location    Sinus drainage    Acute cough    Plan: Was going to start doxycycline but patient states he has been on that before and has never worked for him.  Will start cefdinir, prednisone taper dose, Tessalon Perles.  Warned of potential side effects.  Probiotic.  Push fluids.  Follow-up with PCP.  Notify us if not improving.I told him he should avoid decongestants with his heart history.    No follow-ups on file.    Seen By:  Cal Ash MD      *Document was created using voice recognition software.  Note was reviewed however may contain grammatical errors.

## 2025-03-05 ENCOUNTER — OFFICE VISIT (OUTPATIENT)
Dept: FAMILY MEDICINE CLINIC | Age: 55
End: 2025-03-05
Payer: COMMERCIAL

## 2025-03-05 VITALS
WEIGHT: 200.13 LBS | OXYGEN SATURATION: 96 % | RESPIRATION RATE: 16 BRPM | DIASTOLIC BLOOD PRESSURE: 84 MMHG | HEART RATE: 96 BPM | SYSTOLIC BLOOD PRESSURE: 114 MMHG | TEMPERATURE: 97.3 F | BODY MASS INDEX: 30.33 KG/M2 | HEIGHT: 68 IN

## 2025-03-05 DIAGNOSIS — J06.9 UPPER RESPIRATORY TRACT INFECTION, UNSPECIFIED TYPE: Primary | ICD-10-CM

## 2025-03-05 PROCEDURE — G8427 DOCREV CUR MEDS BY ELIG CLIN: HCPCS | Performed by: NURSE PRACTITIONER

## 2025-03-05 PROCEDURE — 1036F TOBACCO NON-USER: CPT | Performed by: NURSE PRACTITIONER

## 2025-03-05 PROCEDURE — G8417 CALC BMI ABV UP PARAM F/U: HCPCS | Performed by: NURSE PRACTITIONER

## 2025-03-05 PROCEDURE — 3074F SYST BP LT 130 MM HG: CPT | Performed by: NURSE PRACTITIONER

## 2025-03-05 PROCEDURE — 99213 OFFICE O/P EST LOW 20 MIN: CPT | Performed by: NURSE PRACTITIONER

## 2025-03-05 PROCEDURE — 3017F COLORECTAL CA SCREEN DOC REV: CPT | Performed by: NURSE PRACTITIONER

## 2025-03-05 PROCEDURE — 3079F DIAST BP 80-89 MM HG: CPT | Performed by: NURSE PRACTITIONER

## 2025-03-05 RX ORDER — AZITHROMYCIN 250 MG/1
TABLET, FILM COATED ORAL
Qty: 6 TABLET | Refills: 0 | Status: SHIPPED | OUTPATIENT
Start: 2025-03-05 | End: 2025-03-15

## 2025-03-05 RX ORDER — EVOLOCUMAB 140 MG/ML
INJECTION, SOLUTION SUBCUTANEOUS
COMMUNITY
Start: 2024-12-19

## 2025-03-05 RX ORDER — METHYLPREDNISOLONE 4 MG/1
TABLET ORAL
Qty: 1 KIT | Refills: 0 | Status: SHIPPED | OUTPATIENT
Start: 2025-03-05

## 2025-03-05 NOTE — PROGRESS NOTES
ACUTE PRIMARY CARE VISIT  3/5/25  Name: Giancarlo Toure   : 1970   Age: 54 y.o.  Sex: male   Chief Complaint   Patient presents with    Sinus Problem     States that he has chronic sinus issues but they worsened on . Had increased pressure, congestion, dry cough and sore throat. Also has a headache today. Denies body aches and fever.       HPI:     History of Present Illness  The patient is a 54-year-old male presenting with cough and congestion for 5 days. Symptoms include significant nasal congestion, pressure, cough, sore throat, and headache, without body aches or fever. He uses nasal spray and Coricidin HBP twice daily. Amoxicillin was ineffective in the past with similar symptoms; doxycycline and Z-Troy provided relief. Steroids have been beneficial. History includes two myocardial infarctions, hypertension, and well-controlled diabetes (A1c high 5s to low 6s).     ALLERGIES  Allergic to LISINOPRIL, ATORVASTATIN, CODEINE, MORPHINE, SULFA, and ROSUVASTATIN.    MEDICATIONS  Current: lovastatin, Coricidin HBP. Past: amoxicillin, doxycycline, Z-Troy.  Objective:     Vitals:    25 1002   BP: 114/84   Site: Right Upper Arm   Position: Sitting   Cuff Size: Large Adult   Pulse: 96   Resp: 16   Temp: 97.3 °F (36.3 °C)   SpO2: 96%   Weight: 90.8 kg (200 lb 2 oz)   Height: 1.727 m (5' 8\")     Physical Exam  - Lungs are clear  Physical Exam  Vitals and nursing note reviewed.   Constitutional:       Appearance: Normal appearance. He is normal weight.   HENT:      Head: Normocephalic and atraumatic.      Right Ear: External ear normal.      Left Ear: External ear normal.      Nose: Congestion and rhinorrhea present.      Right Sinus: Maxillary sinus tenderness and frontal sinus tenderness present.      Left Sinus: Maxillary sinus tenderness and frontal sinus tenderness present.      Mouth/Throat:      Mouth: Mucous membranes are moist.      Pharynx: Oropharynx is clear. Posterior oropharyngeal erythema

## 2025-08-06 ENCOUNTER — OFFICE VISIT (OUTPATIENT)
Dept: PODIATRY | Age: 55
End: 2025-08-06
Payer: COMMERCIAL

## 2025-08-06 VITALS
BODY MASS INDEX: 29.65 KG/M2 | SYSTOLIC BLOOD PRESSURE: 111 MMHG | TEMPERATURE: 97.3 F | WEIGHT: 195 LBS | DIASTOLIC BLOOD PRESSURE: 77 MMHG

## 2025-08-06 DIAGNOSIS — M76.62 ACHILLES TENDINITIS OF LEFT LOWER EXTREMITY: ICD-10-CM

## 2025-08-06 DIAGNOSIS — M77.31 CALCANEAL SPUR OF BOTH FEET: ICD-10-CM

## 2025-08-06 DIAGNOSIS — M79.672 PAIN IN LEFT FOOT: Primary | ICD-10-CM

## 2025-08-06 DIAGNOSIS — M77.32 CALCANEAL SPUR OF BOTH FEET: ICD-10-CM

## 2025-08-06 PROCEDURE — 3078F DIAST BP <80 MM HG: CPT | Performed by: PODIATRIST

## 2025-08-06 PROCEDURE — G8427 DOCREV CUR MEDS BY ELIG CLIN: HCPCS | Performed by: PODIATRIST

## 2025-08-06 PROCEDURE — 1036F TOBACCO NON-USER: CPT | Performed by: PODIATRIST

## 2025-08-06 PROCEDURE — 3017F COLORECTAL CA SCREEN DOC REV: CPT | Performed by: PODIATRIST

## 2025-08-06 PROCEDURE — 99213 OFFICE O/P EST LOW 20 MIN: CPT | Performed by: PODIATRIST

## 2025-08-06 PROCEDURE — 3074F SYST BP LT 130 MM HG: CPT | Performed by: PODIATRIST

## 2025-08-06 PROCEDURE — G8417 CALC BMI ABV UP PARAM F/U: HCPCS | Performed by: PODIATRIST

## 2025-08-06 RX ORDER — ALBUTEROL SULFATE 90 UG/1
2 INHALANT RESPIRATORY (INHALATION) EVERY 4 HOURS PRN
COMMUNITY
Start: 2025-07-28

## 2025-08-06 RX ORDER — PREDNISONE 10 MG/1
TABLET ORAL
Qty: 18 TABLET | Refills: 1 | Status: SHIPPED | OUTPATIENT
Start: 2025-08-06

## (undated) DEVICE — SOLUTION IV IRRIG POUR BRL 0.9% SODIUM CHL 2F7124

## (undated) DEVICE — COVER HNDL LT DISP

## (undated) DEVICE — SOLUTION IV IRRIG WATER 1000ML POUR BRL 2F7114

## (undated) DEVICE — IMPL DELIVERY SYS,DISTAL BICEPS, BC
Type: IMPLANTABLE DEVICE | Site: ARM | Status: NON-FUNCTIONAL
Brand: ARTHREX®
Removed: 2023-02-07

## (undated) DEVICE — PENCIL ES CRD L10FT HND SWCHING ROCK SWCH W/ EDGE COAT BLDE

## (undated) DEVICE — ADHESIVE SKIN CLSR 0.7ML TOP DERMBND ADV

## (undated) DEVICE — STRIP,CLOSURE,WOUND,MEDI-STRIP,1/2X4: Brand: MEDLINE

## (undated) DEVICE — 2.0MM ROUND SOLID CARBIDE BUR MEDIUM

## (undated) DEVICE — Device

## (undated) DEVICE — PADDING,UNDERCAST,COTTON, 4"X4YD STERILE: Brand: MEDLINE

## (undated) DEVICE — INTENDED FOR TISSUE SEPARATION, AND OTHER PROCEDURES THAT REQUIRE A SHARP SURGICAL BLADE TO PUNCTURE OR CUT.: Brand: BARD-PARKER ® STAINLESS STEEL BLADES

## (undated) DEVICE — PAD,ABDOMINAL,5"X9",ST,LF,25/BX: Brand: MEDLINE INDUSTRIES, INC.

## (undated) DEVICE — PADDING UNDERCAST W4INXL4YD COT FBR LO LINTING WYTEX

## (undated) DEVICE — TOWEL,OR,DSP,ST,BLUE,STD,6/PK,12PK/CS: Brand: MEDLINE

## (undated) DEVICE — SPLINT ORTH W5XL30IN PLSTR OF PARIS LO EXOTHERM SMOOTH

## (undated) DEVICE — BNDG,ELSTC,MATRIX,STRL,4"X5YD,LF,HOOK&LP: Brand: MEDLINE

## (undated) DEVICE — GAUZE,SPONGE,4"X4",8PLY,STRL,LF,10/TRAY: Brand: MEDLINE

## (undated) DEVICE — CLOTH SURG PREP PREOPERATIVE CHLORHEXIDINE GLUC 2% READYPREP

## (undated) DEVICE — SOLUTION IRRIG 1000ML 0.9% SOD CHL USP POUR PLAS BTL

## (undated) DEVICE — DOUBLE BASIN SET: Brand: MEDLINE INDUSTRIES, INC.

## (undated) DEVICE — GLOVE SURG SZ 8 L12IN FNGR THK79MIL GRN LTX FREE

## (undated) DEVICE — 3M™ STERI-DRAPE™ U-DRAPE 1015: Brand: STERI-DRAPE™

## (undated) DEVICE — ELECTRODE PT RET AD L9FT HI MOIST COND ADH HYDRGEL CORDED

## (undated) DEVICE — DRAPE,U/ SHT,SPLIT,PLAS,STERIL: Brand: MEDLINE

## (undated) DEVICE — SURGICAL PROCEDURE PACK HND

## (undated) DEVICE — GOWN,SIRUS,FABRNF,XL,20/CS: Brand: MEDLINE

## (undated) DEVICE — 4-PORT MANIFOLD: Brand: NEPTUNE 2

## (undated) DEVICE — NEEDLE 1/2 CIR SZ 2 SURG MAYO CATGUT

## (undated) DEVICE — YANKAUER,OPEN TIP,W/O VENT,STERILE: Brand: MEDLINE INDUSTRIES, INC.

## (undated) DEVICE — BIT DRL L5IN DIA2MM STD ST S STL TWST BUSA

## (undated) DEVICE — 3M™ COBAN™ NL STERILE NON-LATEX SELF-ADHERENT WRAP, 2084S, 4 IN X 5 YD (10 CM X 4,5 M), 18 ROLLS/CASE: Brand: 3M™ COBAN™

## (undated) DEVICE — ZIMMER® STERILE DISPOSABLE TOURNIQUET CUFF WITH PLC, DUAL PORT, SINGLE BLADDER, 18 IN. (46 CM)

## (undated) DEVICE — DRAPE,REIN 53X77,STERILE: Brand: MEDLINE

## (undated) DEVICE — IMPLANTABLE DEVICE
Type: IMPLANTABLE DEVICE | Site: ARM | Status: NON-FUNCTIONAL
Removed: 2023-02-07